# Patient Record
Sex: MALE | Race: WHITE | Employment: STUDENT | URBAN - METROPOLITAN AREA
[De-identification: names, ages, dates, MRNs, and addresses within clinical notes are randomized per-mention and may not be internally consistent; named-entity substitution may affect disease eponyms.]

---
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
## 2023-01-09 ENCOUNTER — APPOINTMENT (OUTPATIENT)
Dept: CT IMAGING | Age: 18
End: 2023-01-09
Payer: COMMERCIAL

## 2023-01-09 ENCOUNTER — HOSPITAL ENCOUNTER (EMERGENCY)
Age: 18
Discharge: HOME | End: 2023-01-10
Attending: EMERGENCY MEDICINE
Payer: COMMERCIAL

## 2023-01-09 DIAGNOSIS — R51.9 ACUTE NONINTRACTABLE HEADACHE, UNSPECIFIED HEADACHE TYPE: Primary | ICD-10-CM

## 2023-01-09 LAB
A/G RATIO: 1.8 (ref 1.1–2.2)
ALBUMIN SERPL-MCNC: 4.6 G/DL (ref 3.4–5)
ALP BLD-CCNC: 81 U/L (ref 40–129)
ALT SERPL-CCNC: 34 U/L (ref 10–40)
ANION GAP SERPL CALCULATED.3IONS-SCNC: 11 MMOL/L (ref 3–16)
AST SERPL-CCNC: 37 U/L (ref 15–37)
BASOPHILS ABSOLUTE: 0 K/UL (ref 0–0.2)
BASOPHILS RELATIVE PERCENT: 0.6 %
BILIRUB SERPL-MCNC: 1.4 MG/DL (ref 0–1)
BUN BLDV-MCNC: 15 MG/DL (ref 7–20)
CALCIUM SERPL-MCNC: 9.4 MG/DL (ref 8.3–10.6)
CHLORIDE BLD-SCNC: 100 MMOL/L (ref 99–110)
CO2: 27 MMOL/L (ref 21–32)
CREAT SERPL-MCNC: 0.8 MG/DL (ref 0.9–1.3)
EOSINOPHILS ABSOLUTE: 0.1 K/UL (ref 0–0.6)
EOSINOPHILS RELATIVE PERCENT: 1.5 %
GFR SERPL CREATININE-BSD FRML MDRD: >60 ML/MIN/{1.73_M2}
GLUCOSE BLD-MCNC: 86 MG/DL (ref 70–99)
HCT VFR BLD CALC: 45.2 % (ref 40.5–52.5)
HEMOGLOBIN: 15.5 G/DL (ref 13.5–17.5)
LYMPHOCYTES ABSOLUTE: 2.4 K/UL (ref 1–5.1)
LYMPHOCYTES RELATIVE PERCENT: 36.7 %
MCH RBC QN AUTO: 29.5 PG (ref 26–34)
MCHC RBC AUTO-ENTMCNC: 34.2 G/DL (ref 31–36)
MCV RBC AUTO: 86.2 FL (ref 80–100)
MONOCYTES ABSOLUTE: 0.4 K/UL (ref 0–1.3)
MONOCYTES RELATIVE PERCENT: 6.8 %
NEUTROPHILS ABSOLUTE: 3.6 K/UL (ref 1.7–7.7)
NEUTROPHILS RELATIVE PERCENT: 54.4 %
PDW BLD-RTO: 13 % (ref 12.4–15.4)
PLATELET # BLD: 214 K/UL (ref 135–450)
PMV BLD AUTO: 8.7 FL (ref 5–10.5)
POTASSIUM REFLEX MAGNESIUM: 4 MMOL/L (ref 3.5–5.1)
RBC # BLD: 5.24 M/UL (ref 4.2–5.9)
SODIUM BLD-SCNC: 138 MMOL/L (ref 136–145)
TOTAL PROTEIN: 7.2 G/DL (ref 6.4–8.2)
WBC # BLD: 6.6 K/UL (ref 4–11)

## 2023-01-09 PROCEDURE — 70450 CT HEAD/BRAIN W/O DYE: CPT

## 2023-01-09 PROCEDURE — 80053 COMPREHEN METABOLIC PANEL: CPT

## 2023-01-09 PROCEDURE — 99285 EMERGENCY DEPT VISIT HI MDM: CPT

## 2023-01-09 PROCEDURE — 85025 COMPLETE CBC W/AUTO DIFF WBC: CPT

## 2023-01-09 PROCEDURE — 6360000004 HC RX CONTRAST MEDICATION: Performed by: EMERGENCY MEDICINE

## 2023-01-09 PROCEDURE — 6370000000 HC RX 637 (ALT 250 FOR IP): Performed by: EMERGENCY MEDICINE

## 2023-01-09 PROCEDURE — 6360000002 HC RX W HCPCS: Performed by: EMERGENCY MEDICINE

## 2023-01-09 PROCEDURE — 96372 THER/PROPH/DIAG INJ SC/IM: CPT

## 2023-01-09 PROCEDURE — 70498 CT ANGIOGRAPHY NECK: CPT

## 2023-01-09 RX ORDER — KETOROLAC TROMETHAMINE 30 MG/ML
15 INJECTION, SOLUTION INTRAMUSCULAR; INTRAVENOUS ONCE
Status: DISCONTINUED | OUTPATIENT
Start: 2023-01-09 | End: 2023-01-09

## 2023-01-09 RX ORDER — ACETAMINOPHEN 500 MG
1000 TABLET ORAL ONCE
Status: COMPLETED | OUTPATIENT
Start: 2023-01-09 | End: 2023-01-09

## 2023-01-09 RX ORDER — ORPHENADRINE CITRATE 30 MG/ML
60 INJECTION INTRAMUSCULAR; INTRAVENOUS ONCE
Status: COMPLETED | OUTPATIENT
Start: 2023-01-09 | End: 2023-01-09

## 2023-01-09 RX ADMIN — IOPAMIDOL 75 ML: 755 INJECTION, SOLUTION INTRAVENOUS at 22:27

## 2023-01-09 RX ADMIN — ACETAMINOPHEN 1000 MG: 500 TABLET ORAL at 21:50

## 2023-01-09 RX ADMIN — ORPHENADRINE CITRATE 60 MG: 30 INJECTION INTRAMUSCULAR; INTRAVENOUS at 21:50

## 2023-01-09 ASSESSMENT — PAIN SCALES - GENERAL
PAINLEVEL_OUTOF10: 9
PAINLEVEL_OUTOF10: 8

## 2023-01-09 ASSESSMENT — PAIN - FUNCTIONAL ASSESSMENT: PAIN_FUNCTIONAL_ASSESSMENT: 0-10

## 2023-01-09 ASSESSMENT — PAIN DESCRIPTION - DESCRIPTORS: DESCRIPTORS: ACHING

## 2023-01-09 ASSESSMENT — PAIN DESCRIPTION - ORIENTATION: ORIENTATION: MID

## 2023-01-09 ASSESSMENT — PAIN DESCRIPTION - LOCATION: LOCATION: HEAD

## 2023-01-10 VITALS
RESPIRATION RATE: 16 BRPM | DIASTOLIC BLOOD PRESSURE: 67 MMHG | BODY MASS INDEX: 30.48 KG/M2 | OXYGEN SATURATION: 97 % | WEIGHT: 230 LBS | HEIGHT: 73 IN | TEMPERATURE: 98.4 F | SYSTOLIC BLOOD PRESSURE: 115 MMHG | HEART RATE: 60 BPM

## 2023-01-10 RX ORDER — ORPHENADRINE CITRATE 100 MG/1
100 TABLET, EXTENDED RELEASE ORAL 2 TIMES DAILY PRN
Qty: 20 TABLET | Refills: 0 | Status: SHIPPED | OUTPATIENT
Start: 2023-01-10 | End: 2023-01-20

## 2023-01-10 RX ORDER — ACETAMINOPHEN 500 MG
500 TABLET ORAL 4 TIMES DAILY PRN
Qty: 30 TABLET | Refills: 0 | Status: SHIPPED | OUTPATIENT
Start: 2023-01-10

## 2023-01-10 ASSESSMENT — PAIN SCALES - GENERAL: PAINLEVEL_OUTOF10: 2

## 2023-01-10 ASSESSMENT — PAIN - FUNCTIONAL ASSESSMENT: PAIN_FUNCTIONAL_ASSESSMENT: 0-10

## 2023-01-10 NOTE — ED PROVIDER NOTES
[facility]  ED  EMERGENCY DEPARTMENT ENCOUNTER        Pt Name: Ash See  MRN: [id number]  [place] 2005  Date of evaluation: 1/9/2023  Provider: Khushboo Delgado MD  PCP: LAINE Shetty CNP  Note Started: 8:55 PM EST 1/9/23    CHIEF COMPLAINT       Chief Complaint   Patient presents with    Headache     Starting Friday. Was working out and during started have a headache. 8/10 pain       HISTORY OF PRESENT ILLNESS: 1 or more Elements             Doug Roberson Crew is a 21 y.o. male who presents with a headache. Was lifting weights at the gym then he felt a pain radiate from his lower back all the way to the base of his head an since then the pain has been located to the left side of the neck and he feels like the rest of his head feels really tight. The pain has been constant since Fri. Tried ibuprofen and tylenol with no improvement. No fever. There is neck stiffness, he has increased pain with movement of the neck. No weakness, no vision changes, no speech changes, no gait changes. Nursing Notes were all reviewed and agreed with or any disagreements were addressed in the HPI. REVIEW OF SYSTEMS :      Review of Systems    Positives and Pertinent negatives as per HPI. SURGICAL HISTORY     Past Surgical History:   Procedure Laterality Date    ADENOIDECTOMY      TONSILLECTOMY      TYMPANOPLASTY         CURRENTMEDICATIONS       Previous Medications    No medications on file       ALLERGIES     Patient has no known allergies. FAMILYHISTORY     History reviewed. No pertinent family history. SOCIAL HISTORY       Social History     Tobacco Use    Smoking status: Never    Smokeless tobacco: Never   Vaping Use    Vaping Use: Never used   Substance Use Topics    Alcohol use: No    Drug use: No       SCREENINGS   NIH Stroke Scale  Interval: Baseline  Level of Consciousness (1a): Alert  LOC Questions (1b):  Answers both correctly  LOC Commands (1c): Performs both tasks correctly  Best Gaze (2): Normal  Visual (3): No visual loss  Facial Palsy (4): Normal symmetrical movement  Motor Arm, Left (5a): No drift  Motor Arm, Right (5b): No drift  Motor Leg, Left (6a): No drift  Motor Leg, Right (6b): No drift  Limb Ataxia (7): Absent  Sensory (8): Normal  Best Language (9): No aphasia  Dysarthria (10): Normal  Extinction and Inattention (11): No abnormality  Total: 0    Saji Coma Scale  Eye Opening: Spontaneous  Best Verbal Response: Oriented  Best Motor Response: Obeys commands  [place] Coma Scale Score: 15                CIWA Assessment  BP: 126/73  Heart Rate: 65           PHYSICAL EXAM  1 or more Elements     ED Triage Vitals [01/09/23 1854]   BP Temp Temp Source Heart Rate Resp SpO2 Height Weight - Scale   (!) 151/85 98.4 °F (36.9 °C) Oral 61 16 99 % 6' 1\" (1.854 m) (!) 230 lb (104.3 kg)       Physical Exam  Vitals and nursing note reviewed. Constitutional:       Appearance: Normal appearance. He is well-developed. He is not ill-appearing. HENT:      Head: Normocephalic and atraumatic. Right Ear: External ear normal.      Left Ear: External ear normal.      Nose: Nose normal.   Eyes:      General: No scleral icterus. Right eye: No discharge. Left eye: No discharge. Extraocular Movements: Extraocular movements intact. Conjunctiva/sclera: Conjunctivae normal.      Pupils: Pupils are equal, round, and reactive to light. Neck:      Comments: No rigidity or pain with flexion but does have pain to rotation  Cardiovascular:      Rate and Rhythm: Normal rate and regular rhythm. Heart sounds: Normal heart sounds. Pulmonary:      Effort: Pulmonary effort is normal. No respiratory distress. Breath sounds: Normal breath sounds. No wheezing or rales. Abdominal:      General: Bowel sounds are normal. There is no distension. Palpations: Abdomen is soft. Tenderness: There is no abdominal tenderness.  There is no guarding or rebound. Musculoskeletal:         General: Tenderness present. No swelling, deformity or signs of injury. Cervical back: Neck supple. Tenderness present. No rigidity. Comments: Tenderness to left upper back and neck   Skin:     Coloration: Skin is not pale. Neurological:      Mental Status: He is alert. Comments: NIHSS 0, no nystagmus, no ataxia, normal gait   Psychiatric:         Mood and Affect: Mood normal.         Behavior: Behavior normal.           DIAGNOSTIC RESULTS   LABS:    Labs Reviewed   COMPREHENSIVE METABOLIC PANEL W/ REFLEX TO MG FOR LOW K - Abnormal; Notable for the following components:       Result Value    Creatinine 0.8 (*)     Total Bilirubin 1.4 (*)     All other components within normal limits   CBC WITH AUTO DIFFERENTIAL       When ordered only abnormal lab results are displayed. All other labs were within normal range or not returned as of this dictation. EKG:     RADIOLOGY:   Non-plain film images such as CT, Ultrasound and MRI are read by the radiologist. Plain radiographic images are visualized and preliminarily interpreted by the ED Provider with the below findings:        Interpretation per the Radiologist below, if available at the time of this note:    CTA HEAD NECK W CONTRAST   Final Result   Unremarkable CTA of the head and neck. CT Head W/O Contrast   Final Result   No acute CT abnormality identified. No results found. No results found. PROCEDURES   Unless otherwise noted below, none     Procedures    CRITICAL CARE TIME   Total Critical Care time was 35 minutes, excluding separately reportable procedures. There was a high probability of clinically significant/life threatening deterioration in the patient's condition which required my urgent intervention. PAST MEDICAL HISTORY      has a past medical history of Anxiety, Depression, and Seasonal allergies.      EMERGENCY DEPARTMENT COURSE and DIFFERENTIAL DIAGNOSIS/MDM:   Vitals: Vitals:    01/09/23 1854 01/09/23 2149   BP: (!) 151/85 126/73   Pulse: 61 65   Resp: 16 16   Temp: 98.4 °F (36.9 °C)    TempSrc: Oral    SpO2: 99% 97%   Weight: (!) 230 lb (104.3 kg)    Height: 6' 1\" (1.854 m)        Patient was given the following medications:  Medications   orphenadrine (NORFLEX) injection 60 mg (60 mg IntraMUSCular Given 1/9/23 2150)   acetaminophen (TYLENOL) tablet 1,000 mg (1,000 mg Oral Given 1/9/23 2150)   iopamidol (ISOVUE-370) 76 % injection 75 mL (75 mLs IntraVENous Given 1/9/23 2227)             Is this patient to be included in the SEP-1 Core Measure due to severe sepsis or septic shock? No   Exclusion criteria - the patient is NOT to be included for SEP-1 Core Measure due to: Infection is not suspected    Chronic Conditions:     CONSULTS: (Who and What was discussed)  None                CC/HPI Summary, DDx, ED Course, and Reassessment: Adult male who comes in with a headache. The patient's headache started while he was exercising. He has associated neck pain and neck stiffness. This is concerning for subarachnoid hemorrhage, based on his history and physical exam I have little suspicion for acute meningitis or acute CVA. Little suspicion for carotid artery dissection. The most likely diagnosis however is musculoskeletal pain as he does have significant tenderness to the muscles of the neck and upper back on the left. Patient is given a dose of Norflex IV and Tylenol orally. His pain is initially rated at an 8 or 9. Basic laboratory studies are ordered. Laboratory studies showed no acute findings when his hematocrit is normal lessening the chance of this being a subarachnoid hemorrhage. His symptoms have been for several days a CT scan of his head and CTA of his head and neck ordered. Both are read and interpreted by the radiologist and are negative for any acute process decreasing the chance of this being an acute CVA or a mass.   I discussed with the patient and his mother the possibility of this still being a subarachnoid hemorrhage and that the only definitive way to rule this out would be a lumbar puncture. The patient states that his pain has significantly improved after the medications he refuses a lumbar puncture at this time. Physical exam is unremarkable for a completely normal neurologic exam.  I think that this is reasonable at this point. The patient is strongly encouraged to return to the emergency room should his symptoms worsen. I am the Primary Clinician of Record. FINAL IMPRESSION      1.  Acute nonintractable headache, unspecified headache type          DISPOSITION/PLAN     DISPOSITION Decision To Discharge 01/10/2023 12:09:26 AM      PATIENT REFERRED TO:  LAINE Davila CNP [address] [facility]  [phone]    Schedule an appointment as soon as possible for a visit       [facility]  ED  [facility] [address]  [phone]    If symptoms worsen    DISCHARGE MEDICATIONS:  New Prescriptions    ACETAMINOPHEN (TYLENOL) 500 MG TABLET    Take 1 tablet by mouth 4 times daily as needed for Pain    ORPHENADRINE (NORFLEX) 100 MG EXTENDED RELEASE TABLET    Take 1 tablet by mouth 2 times daily as needed for Muscle spasms or Other (headache)       DISCONTINUED MEDICATIONS:  Discontinued Medications    No medications on file              (Please note that portions of this note were completed with a voice recognition program.  Efforts were made to edit the dictations but occasionally words are mis-transcribed.)    Jeanine Marshall MD (electronically signed)            Jeanine Marshall MD  01/10/23 5995

## 2023-02-01 ENCOUNTER — HOSPITAL ENCOUNTER (OUTPATIENT)
Dept: PHYSICAL THERAPY | Age: 18
Setting detail: THERAPIES SERIES
Discharge: HOME | End: 2023-02-01
Payer: COMMERCIAL

## 2023-02-01 PROCEDURE — 97140 MANUAL THERAPY 1/> REGIONS: CPT

## 2023-02-01 PROCEDURE — 97162 PT EVAL MOD COMPLEX 30 MIN: CPT

## 2023-02-01 NOTE — FLOWSHEET NOTE
[facility], [facility], [address], [address]  Phone: [phone]  Fax [phone]    Physical Therapy Daily Treatment Note    Date:  2023    Patient Name:  Ash See    :  2005  MRN: [id number]  Medical Diagnosis: Strain of muscle, fascia and tendon at neck level, subsequent encounter [S16. 1XXD]  Treatment Diagnosis:  decreased mobility, pain  Onset Date:  23                Referral Date: 2023           Referring Provider: Checo Solis   Insurance Provider: Precious Phillip  Restrictions/Precautions:   none  Plan of care signed (Y/N):  sent  Visit# / total visits:       G-Code (if applicable):          NDI     Time in:   11:25      Timed Treatment: 10  Total Treatment Time:  40  ________________________________________________________________________________________    Pain Level:    /10  SUBJECTIVE:  see eval    OBJECTIVE:     Exercise/Equipment Resistance/Repetitions Other comments                                                                                                                                             Other Therapeutic Activities:      Manual Treatments:     cervical distraction. Supine thoracic PA mobilization grade IV. STM to B upper quarter.     Modalities:      Test/Measurements:         ASSESSMENT:         Treatment/Activity Tolerance:   []Patient tolerated treatment well [] Patient limited by fatique  []Patient limited by pain [] Patient limited by other medical complications  [] Other:     Goals:    Short term goal 1: Pt will be indep in HEP  Short term goal 2: Pt will decrease pain 25%  Short term goal 3: Pt will increase cervical ROM by >10%  Short term goal 4: Pt will increase SNF strength to 4/5  Short term goal 5: Pt will return to driving without limitation    Plan: [x] Continue per plan of care [] Alter current plan (see comments)   [x] Plan of care initiated [] Hold pending MD visit [] Discharge      Plan for Next Session:  Biomechanical correction of problems as they present. Facilitate correct muscle firing patterns and activation with appropriate activities. Progress patient as tolerated.      Re-Certification Due Date:         Signature:  José Antonio Jones, PT

## 2023-02-01 NOTE — THERAPY EVALUATION
Rick  [facility], [facility], [address], [address]  Phone: [phone]  Fax [phone]     Dear Referring Practitioner: Dr. Kirsten Starr,     We had the pleasure of evaluating the following patient for physical therapy services at [facility]. A summary of our findings can be found in the initial assessment below. This includes our plan of care. If you have any questions or concerns regarding these findings, please do not hesitate to contact me at the office phone number. Thank you for the referral.         Physician Signature:_______________________________Date:__________________  By signing above (or electronic signature), therapists plan is approved by physician      CERVICAL, THORACIC SPINE, AND UPPER EXTREMITY PHYSICAL THERAPY EVALUATION        Evaluation Date: 2/1/2023   Patient Name: Katherine Albert   YOB: 2005    Medical Diagnosis: Strain of muscle, fascia and tendon at neck level, subsequent encounter [S16. 1XXD]  Treatment Diagnosis:  decreased mobility, pain  Onset Date:  1/13/23    Referral Date: 2/1/2023   Referring Provider: Angelito Jansen   Insurance Provider: Uli Ball  Restrictions/Precautions:   none    SUBJECTIVE FINDINGS    History of Present Illness:      Pt presents to physical therapy with c/o neck pain. Notes that he injured himself back on Jan 13 when he was dead lifting. Reports that he felt a shooting pain that went up his back to the base of his skull. Notes that this pain now moves from the L side of his neck over to his R upper trap. Does note a little tingling at the base of the skull on the L side. Notes that he does get HAs and dizziness. Denies double vision, trouble speaking/swallowing. Deep breath creates a \"Squeezing\" of his head. Reports that movement or \"increasing blood flow\" makes his head \"kill. \" Taking medication helps for a few hours.  Symptoms have remained the same. Reports that he hasn't tried turning and moving from his neck. Stiff and fearful of moving. Reports that he does not feel weak. Notes that he drives a lot for work. Continuing to work but looking in the blind spot is hard and painful. Sleeping is okay if his neck is completely flat. 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 Impression   No acute CT abnormality identified.        Posture    [x]   Forward head  []   Forward flexed trunk   [x]   Pronounced CT junction    [x]   Increased thoracic kyphosis   []   Increased lumbar lordosis   []   Scoliosis   []   Swayback  []   Scapular winging:  []   Other:       Palpation/Tenderness/Visual Inspection      Patient reported tenderness with palpation  [x]   Yes   []   No   []   NT  Location:  global tenderness noted, increased tenderness noted at L first rib, mid cervical spine and suboccipitals  PT notes increased muscle tone   []   Yes   [x]   No   []   NT  Location:   Overhead mobility: limited elevated, slow movement, increased scapular abduction and downward rotation    Special Tests- Cervical and Thoracic   Special Test Findings Comments   Pierce Wilburn [x]Neg   []Pos   []NT    Alar ligament/ C2 spinous kick test [x]Neg   []Pos   []NT    Vertebral Artery/Positional Provocative Testing [x]Neg   []Pos R   []Pos L   []NT    Dizziness, Nausea, Double Vision []Neg   [x]Pos       Spurling's/Foraminal []Neg   []Pos R   []Pos L   [x]NT    Cranial Nerves [x]Neg   []Pos R   []Pos L Cervical Distraction [x]Relief noted   []No relief noted   []NT    Cervical Compression  []Neg   []Pos   [x]NT    Median nerve tension test []Neg   []Pos R   []Pos L   [x]NT    Radial nerve tension test []Neg   []Pos R   []Pos L   [x]NT    Ulnar nerve tension test []Neg   []Pos R   []Pos L   [x]NT        Range of Motion/Strength Testing/Myotomes    [x] All ROM and strength WNL except as marked below  *denotes pain  Range Tested AROM Strength (MMT)/Myotomes    Left (or  neutral)  Right Left (or neutral) Right   Cervical Flex (C1-2) 25%      Cervical Ext 75%      Cervical SB (C3) 75% 75%     Cervical Rot 50% 50%     Shoulder Flex   4 4   Shoulder Abd (C5)   5 5   Shoulder IR   5 5   Shoulder ER    5 5   Elbow flex (C6)   5 5   Elbow ext (C7)   5 5   Wrist ext (C6)       Wrist flex (C7)       Thumb Ext (C8)         UE Dermatomes     [x] All dermatomes WNL except as marked below   Dermatomes  Left  Right Comments   Posterior Head (C2)      Lateral Upper Neck (C3)      Supraclavicular (C4)      Lateral Upper Arm (C5)      Lateral Forearm/1st (C6)      3rd digit (C7)      5th digit (C8)      Medial Arm (T1)        Reflexes      [x] All reflexes WNL except as marked below  Reflex Left Right   Biceps (C5, C6)     Brachioradialis (C6)     Triceps (C7)     Hoffmans - -   Clonus - -     Scapula Strength     [x] All strength WNL except as marked below   Scapula Left Right Comments   Middle Trapezius 4 4    Lower Trapezius 3- 3-    Rhomboid 4 4    Serratus Anterior   3+ 4    Latissimus Dorsi        Flexibility   [x] All flexibility WNL except as marked below     Muscle Findings   Pectoralis Minor [] WNL   [x] Tight    Pec Major - Lower [] WNL   [x] Tight   Pec Major - Upper [] WNL   [x] Tight   Latissimus Dorsi  [] WNL   [x] Tight   Levator Scapula [] WNL   [] Tight   Suboccipitals [] WNL   [x] Tight   Upper Trapezius [] WNL   [x] Tight   Scalenes [] WNL   [x] Tight     Joint Mobility/Accessory Movements (cervical, UE, rib) 1st rib - hypomobility noted L  CTJ - hypomobile with PA and rotation joint play B  C1/2 - hypomobile with R rotation    ASSESSMENT  Pt is a 22 y/o presenting to physical therapy with c/o cervical pain and decreased mobility. Through evaluation and examination, identified findings consistent with mid and upper cervical joint dysfunction. Pt also demonstrated 1st rib dysfunction on the L. Pt with increased tenderness to touch with anxiety observed upon movement - very slow labored motions. PT recommending skilled physical therapy in order to address goals and return to function. Body Structures, Functions, Activity Limitations Requiring Skilled Therapeutic Intervention: Decreased functional mobility ,Decreased ADL status,Decreased ROM,Decreased body mechanics,Decreased strength,Increased pain     Statement of Medical Necessity: Physical Therapy is both indicated and medically necessary as outlined in the POC to increase the likelihood of meeting the functionally related goals stated below.       Eval Complexity:    Decision Making: MOD Complexity     PLAN OF CARE    Frequency: 2x/wk for 4 weeks  Current Treatment Recommendations: Therapeutic exercise, therapeutic activity, manual therapy, neuromuscular re-education     FUNCTIONAL OUTCOME MEASURE  NDI 21/50     GOALS  Short term goal 1: Pt will be indep in HEP  Short term goal 2: Pt will decrease pain 25%  Short term goal 3: Pt will increase cervical ROM by >10%  Short term goal 4: Pt will increase SNF strength to 4/5  Short term goal 5: Pt will return to driving without limitation      Time In: 11:35  Time Out: 12:15  Timed Code Treatment Minutes: 10 minutes            Total Treatment Time:  40 minutes    Misha Long PT DPT license # [id number]

## 2023-02-08 ENCOUNTER — HOSPITAL ENCOUNTER (OUTPATIENT)
Dept: PHYSICAL THERAPY | Age: 18
Setting detail: THERAPIES SERIES
Discharge: HOME | End: 2023-02-08
Payer: COMMERCIAL

## 2023-02-08 PROCEDURE — 97140 MANUAL THERAPY 1/> REGIONS: CPT

## 2023-02-08 PROCEDURE — 97110 THERAPEUTIC EXERCISES: CPT

## 2023-02-08 NOTE — FLOWSHEET NOTE
[facility], [facility], [address], [address]  Phone: [phone]  Fax [phone]    Physical Therapy Daily Treatment Note    Date:  2023    Patient Name:  Malgorzata Chandler    :  2005  MRN: [id number]  Medical Diagnosis: Strain of muscle, fascia and tendon at neck level, subsequent encounter [S16. 1XXD]  Treatment Diagnosis:  decreased mobility, pain  Onset Date:  23                Referral Date: 2023           Referring Provider: Brett Lawrence   Insurance Provider: [facility]  Restrictions/Precautions:   none  Plan of care signed (Y/N):  sent  Visit# / total visits:       G-Code (if applicable):          NDI     Time in:   7:12     Timed Treatment: 33  Total Treatment Time:  40  ________________________________________________________________________________________    Pain Level:    /10  SUBJECTIVE:  Pt notes that he has been trying more movement but he popped his neck yesterday nd this gave him a HA. Notes that prior to that everything was going okay. \"Still hurts, still a little stiff. \"    OBJECTIVE: [id number]    Exercise/Equipment Resistance/Repetitions Other comments          Supine chin tuck 10x10\"    Supine cervical rotation X10 B    Supine pivot prone X10                                                                                                                   Other Therapeutic Activities:      Manual Treatments:     cervical distraction. Supine thoracic PA mobilization grade IV. STM to B upper quarter. Suboccipital release. Gentle cervical side glides throughout cervical spine B. Poor tolerance to higher grade mobilization and declined grade V this date. Modalities:      Test/Measurements:         ASSESSMENT:    Pt tolerated session well. Increased cervical ROM. Pt continues with guarded movement, limiting ROM. Initiated HEP without issue.      Treatment/Activity Tolerance: []Patient tolerated treatment well [] Patient limited by fatique  [x]Patient limited by pain [] Patient limited by other medical complications  [] Other:     Goals:    Short term goal 1: Pt will be indep in HEP  Short term goal 2: Pt will decrease pain 25%  Short term goal 3: Pt will increase cervical ROM by >10%  Short term goal 4: Pt will increase SNF strength to 4/5  Short term goal 5: Pt will return to driving without limitation    Plan: [x] Continue per plan of care [] Alter current plan (see comments)   [] Plan of care initiated [] Hold pending MD visit [] Discharge      Plan for Next Session:  Biomechanical correction of problems as they present. Facilitate correct muscle firing patterns and activation with appropriate activities. Progress patient as tolerated.      Re-Certification Due Date:         Signature:  Hubert Serrano PT

## 2023-02-14 ENCOUNTER — HOSPITAL ENCOUNTER (OUTPATIENT)
Dept: PHYSICAL THERAPY | Age: 18
Setting detail: THERAPIES SERIES
Discharge: HOME | End: 2023-02-14
Payer: COMMERCIAL

## 2023-02-14 PROCEDURE — 97110 THERAPEUTIC EXERCISES: CPT

## 2023-02-14 PROCEDURE — 97140 MANUAL THERAPY 1/> REGIONS: CPT

## 2023-02-14 NOTE — FLOWSHEET NOTE
Rick  [facility]. and Therapy, [facility],  [address]  [facility], [address]  Phone: [phone]  Fax [phone]    Physical Therapy Daily Treatment Note    Date:  2023    Patient Name:  Dash Montez    :  2005  MRN: [id number]  Medical Diagnosis: Strain of muscle, fascia and tendon at neck level, subsequent encounter [S16. 1XXD]  Treatment Diagnosis:  decreased mobility, pain  Onset Date:  23                Referral Date: 2023           Referring Provider: dINK   Insurance Provider: Justin Lazo  Restrictions/Precautions:   none  Plan of care signed (Y/N):  sent  Visit# / total visits:       G-Code (if applicable):          NDI     Time in:   9:07     Timed Treatment: 38  Total Treatment Time:  38  ________________________________________________________________________________________    Pain Level:    /10  SUBJECTIVE:  Pt notes that he is okay. \"I have a really bad headache this morning. \" Notes that work is going well. OBJECTIVE: [id number]    Exercise/Equipment Resistance/Repetitions Other comments          Supine chin tuck 10x10\"    Supine cervical rotation X10 B    Supine pivot prone X10     Supine S HABD x15 Grey TB   Prone W    T 5x10\"  5x10\"                                                                                                    Other Therapeutic Activities:      Manual Treatments:     cervical distraction. Supine thoracic PA mobilization grade IV. STM to B upper quarter. Suboccipital release. Gentle cervical side glides throughout cervical spine B. Poor tolerance to higher grade mobilization and declined grade V this date. Modalities:      Test/Measurements:         ASSESSMENT:    Pt tolerated session well. Increased cervical ROM. Pt continues with guarded movement, limiting ROM. Progressed exercise without issue.      Treatment/Activity Tolerance:   []Patient tolerated treatment well [] Patient limited by fatique  [x]Patient limited by pain [] Patient limited by other medical complications  [] Other:     Goals:    Short term goal 1: Pt will be indep in HEP  Short term goal 2: Pt will decrease pain 25%  Short term goal 3: Pt will increase cervical ROM by >10%  Short term goal 4: Pt will increase SNF strength to 4/5  Short term goal 5: Pt will return to driving without limitation    Plan: [x] Continue per plan of care [] Alter current plan (see comments)   [] Plan of care initiated [] Hold pending MD visit [] Discharge      Plan for Next Session:  Biomechanical correction of problems as they present. Facilitate correct muscle firing patterns and activation with appropriate activities. Progress patient as tolerated.      Re-Certification Due Date:         Signature:  Gil Dhaliwal PT

## 2023-02-16 ENCOUNTER — HOSPITAL ENCOUNTER (OUTPATIENT)
Dept: PHYSICAL THERAPY | Age: 18
Setting detail: THERAPIES SERIES
Discharge: HOME | End: 2023-02-16
Payer: COMMERCIAL

## 2023-02-16 PROCEDURE — 97110 THERAPEUTIC EXERCISES: CPT

## 2023-02-16 PROCEDURE — 97140 MANUAL THERAPY 1/> REGIONS: CPT

## 2023-02-16 NOTE — FLOWSHEET NOTE
[facility], [facility], [address], [address]  Phone: [phone]  Fax [phone]    Physical Therapy Daily Treatment Note    Date:  2023    Patient Name:  Cornelio Pierce    :  2005  MRN: [id number]  Medical Diagnosis: Strain of muscle, fascia and tendon at neck level, subsequent encounter [S16. 1XXD]  Treatment Diagnosis:  decreased mobility, pain  Onset Date:  23                Referral Date: 2023           Referring Provider: Leta Berger   Insurance Provider: [facility]  Restrictions/Precautions:   none  Plan of care signed (Y/N):  sent  Visit# / total visits:  3/8     G-Code (if applicable):          NDI     Time in:   12:45     Timed Treatment: 38  Total Treatment Time:  38  ________________________________________________________________________________________    Pain Level:    /10  SUBJECTIVE:  Pt notes that he is okay. \"I have a really bad headache this morning. \" Notes that work is going well. OBJECTIVE: [id number]    Exercise/Equipment Resistance/Repetitions Other comments          Supine chin tuck 10x10\"  5x10\"   Manual resistance   Supine cervical rotation X10 B    Supine pivot prone X10     Supine S HABD x15 Grey TB   Prone W    T 5x10\"  5x10\"    Upper trap wall slide x5    Foam roll stretch 3 min                                                                                      Other Therapeutic Activities:      Manual Treatments:     cervical distraction. Supine thoracic PA mobilization grade IV. STM to B upper quarter. Suboccipital release. Gentle cervical side glides throughout cervical spine B. Poor tolerance to higher grade mobilization and declined grade V this date. Modalities:      Test/Measurements:         ASSESSMENT:    Pt tolerated session well. Increased cervical ROM. Pt continues with guarded movement, limiting ROM. Progressed exercise without issue. Treatment/Activity Tolerance:   []Patient tolerated treatment well [] Patient limited by fatique  [x]Patient limited by pain [] Patient limited by other medical complications  [] Other:     Goals:    Short term goal 1: Pt will be indep in HEP  Short term goal 2: Pt will decrease pain 25%  Short term goal 3: Pt will increase cervical ROM by >10%  Short term goal 4: Pt will increase SNF strength to 4/5  Short term goal 5: Pt will return to driving without limitation    Plan: [x] Continue per plan of care [] Alter current plan (see comments)   [] Plan of care initiated [] Hold pending MD visit [] Discharge      Plan for Next Session:  Biomechanical correction of problems as they present. Facilitate correct muscle firing patterns and activation with appropriate activities. Progress patient as tolerated.      Re-Certification Due Date:         Signature:  Melinda Agrawal PT

## 2023-02-21 ENCOUNTER — HOSPITAL ENCOUNTER (OUTPATIENT)
Dept: PHYSICAL THERAPY | Age: 18
Setting detail: THERAPIES SERIES
Discharge: HOME | End: 2023-02-21
Payer: COMMERCIAL

## 2023-02-21 PROCEDURE — 97140 MANUAL THERAPY 1/> REGIONS: CPT

## 2023-02-21 NOTE — FLOWSHEET NOTE
Rick  77. and Therapy, [facility],  [address], [address]  Phone: [phone]  Fax [phone]    Physical Therapy Daily Treatment Note    Date:  2023    Patient Name:  Kiesha Ndiaye    :  2005  MRN: [id number]  Medical Diagnosis: Strain of muscle, fascia and tendon at neck level, subsequent encounter [S16. 1XXD]  Treatment Diagnosis:  decreased mobility, pain  Onset Date:  23                Referral Date: 2023           Referring Provider: Giorgi Alegria   Insurance Provider: Delbert Gomez  Restrictions/Precautions:   none  Plan of care signed (Y/N):  sent  Visit# / total visits:       G-Code (if applicable):          NDI     Time in:   10:00     Timed Treatment: 15  Total Treatment Time: 15  ________________________________________________________________________________________    Pain Level:    /10  SUBJECTIVE:  Pt arrived 30 minutes late to today's session. Notes that the exercises haven't been hurting. Also notes that he is doing okay but still a little tense when he looks up and right. Reports that he tried to carry a jump box and that gave him a HA. OBJECTIVE: [id number]    Exercise/Equipment Resistance/Repetitions Other comments          Supine chin tuck   Manual resistance   Supine cervical rotation    Supine pivot prone    Supine S HABD Chavis TB   Prone W    T    Upper trap wall slide    Foam roll stretch 3 min                                                                                      Other Therapeutic Activities:      Manual Treatments:     cervical distraction. Supine thoracic PA mobilization grade IV. STM to B upper quarter. Suboccipital release. Gentle cervical side glides throughout cervical spine B, with cavitation at C23/3 on L during set-up. Poor tolerance to higher grade mobilization and declined grade V this date.      Modalities:      Test/Measurements:         ASSESSMENT:    Pt tolerated session well. Increased cervical ROM following manual therapy. Session limited by late arrival.     Treatment/Activity Tolerance:   []Patient tolerated treatment well [] Patient limited by fatique  [x]Patient limited by pain [] Patient limited by other medical complications  [] Other:     Goals:    Short term goal 1: Pt will be indep in HEP  Short term goal 2: Pt will decrease pain 25%  Short term goal 3: Pt will increase cervical ROM by >10%  Short term goal 4: Pt will increase SNF strength to 4/5  Short term goal 5: Pt will return to driving without limitation    Plan: [x] Continue per plan of care [] Alter current plan (see comments)   [] Plan of care initiated [] Hold pending MD visit [] Discharge      Plan for Next Session:  Biomechanical correction of problems as they present. Facilitate correct muscle firing patterns and activation with appropriate activities. Progress patient as tolerated.      Re-Certification Due Date:         Signature:  Kaity Schafer, PT

## 2023-02-24 ENCOUNTER — HOSPITAL ENCOUNTER (OUTPATIENT)
Dept: PHYSICAL THERAPY | Age: 18
Setting detail: THERAPIES SERIES
Discharge: HOME | End: 2023-02-24
Payer: COMMERCIAL

## 2023-02-24 PROCEDURE — 97140 MANUAL THERAPY 1/> REGIONS: CPT

## 2023-02-24 PROCEDURE — 97110 THERAPEUTIC EXERCISES: CPT

## 2023-02-24 NOTE — FLOWSHEET NOTE
Rick  77. and Therapy, [facility],  [address]  [place], [address]  Phone: [phone]  Fax [phone]    Physical Therapy Daily Treatment Note    Date:  2023    Patient Name:  Jm Moss    :  2005  MRN: [id number]  Medical Diagnosis: Strain of muscle, fascia and tendon at neck level, subsequent encounter [S16. 1XXD]  Treatment Diagnosis:  decreased mobility, pain  Onset Date:  23                Referral Date: 2023           Referring Provider: Angeline Heart   Insurance Provider: Fabiola Santamaria  Restrictions/Precautions:   none  Plan of care signed (Y/N):  sent  Visit# / total visits:       G-Code (if applicable):          NDI     Time in:   1:50    Timed Treatment: 40  Total Treatment Time: 40  ________________________________________________________________________________________    Pain Level:    /10  SUBJECTIVE:  Pt notes that he went back to the gym yesterday and did a very light workout. Notes that it was good and bad. Notes that he did not re-injure himself. Reports everything was good except he had a little stress with the preacher curl. OBJECTIVE: [id number]    Exercise/Equipment Resistance/Repetitions Other comments          Supine chin tuck    Lift from hands 10x10\"  x10 Manual resistance   Supine cervical rotation    Supine pivot prone    On wall X10   x10    Standing S HABD x15 Chavis TB   Prone W    T 5x10\"  5x10\"    Upper trap wall slide x10    Foam roll protocol 6 min                                                                                    Other Therapeutic Activities:      Manual Treatments:     cervical distraction. Supine thoracic PA mobilization grade IV. STM to B upper quarter. Suboccipital release. Gentle cervical side glides throughout cervical spine B, with cavitation at C23/3 on L during set-up. Modalities:      Test/Measurements:         ASSESSMENT:    Pt tolerated session well. Increased cervical ROM following manual therapy. Progress exercise without issue.     Treatment/Activity Tolerance:   []Patient tolerated treatment well [] Patient limited by fatique  [x]Patient limited by pain [] Patient limited by other medical complications  [] Other:     Goals:    Short term goal 1: Pt will be indep in HEP  Short term goal 2: Pt will decrease pain 25%  Short term goal 3: Pt will increase cervical ROM by >10%  Short term goal 4: Pt will increase SNF strength to 4/5  Short term goal 5: Pt will return to driving without limitation    Plan: [x] Continue per plan of care [] Alter current plan (see comments)   [] Plan of care initiated [] Hold pending MD visit [] Discharge      Plan for Next Session:  Biomechanical correction of problems as they present. Facilitate correct muscle firing patterns and activation with appropriate activities. Progress patient as tolerated.     Re-Certification Due Date:         Signature:  Aneesh Alvarez, PT

## 2023-02-28 ENCOUNTER — APPOINTMENT (OUTPATIENT)
Dept: PHYSICAL THERAPY | Age: 18
End: 2023-02-28
Payer: COMMERCIAL

## 2023-03-07 ENCOUNTER — HOSPITAL ENCOUNTER (OUTPATIENT)
Dept: PHYSICAL THERAPY | Age: 18
Setting detail: THERAPIES SERIES
Discharge: HOME | End: 2023-03-07

## 2023-03-07 NOTE — PROGRESS NOTES
Physical Therapy  Cancellation/No-show Note  Patient Name:  Jojo Starr  :  2005   Date:  3/7/2023  Cancels to date: 0  No-shows to date: 1    For today's appointment patient:  [] Cancelled  [] Rescheduled appointment  [x] No-show     Reason given by patient:  [] Patient ill  [] Conflicting appointment  [] No transportation    [] Conflict with work  [] No reason given  [] Other:     Comments:      Electronically signed by:  Estiven Dillard PT

## 2023-03-10 ENCOUNTER — HOSPITAL ENCOUNTER (OUTPATIENT)
Dept: PHYSICAL THERAPY | Age: 18
Setting detail: THERAPIES SERIES
Discharge: HOME | End: 2023-03-10

## 2023-03-10 NOTE — PROGRESS NOTES
Physical Therapy  Cancellation/No-show Note  Patient Name:  Devan House  :  2005   Date:  3/10/2023  Cancels to date: 0  No-shows to date: 2    For today's appointment patient:  [] Cancelled  [] Rescheduled appointment  [x] No-show     Reason given by patient:  [] Patient ill  [] Conflicting appointment  [] No transportation    [] Conflict with work  [] No reason given  [] Other:     Comments:      Electronically signed by:  Jill Haley PT

## 2023-03-14 ENCOUNTER — HOSPITAL ENCOUNTER (OUTPATIENT)
Dept: PHYSICAL THERAPY | Age: 18
Setting detail: THERAPIES SERIES
Discharge: HOME | End: 2023-03-14

## 2023-03-14 NOTE — PROGRESS NOTES
Physical Therapy  Cancellation/No-show Note  Patient Name:  Noemi Strange  :  2005   Date:  3/14/2023  Cancels to date: 0  No-shows to date: 3    For today's appointment patient:  [] Cancelled  [] Rescheduled appointment  [x] No-show     Reason given by patient:  [] Patient ill  [] Conflicting appointment  [] No transportation    [] Conflict with work  [] No reason given  [] Other:     Comments:      Electronically signed by:  Francisco J Griggs PT

## 2023-10-26 ENCOUNTER — HOSPITAL ENCOUNTER (EMERGENCY)
Age: 18
Discharge: HOME | End: 2023-10-26
Payer: COMMERCIAL

## 2023-10-26 ENCOUNTER — APPOINTMENT (OUTPATIENT)
Dept: GENERAL RADIOLOGY | Age: 18
End: 2023-10-26
Payer: COMMERCIAL

## 2023-10-26 VITALS
RESPIRATION RATE: 15 BRPM | DIASTOLIC BLOOD PRESSURE: 73 MMHG | HEART RATE: 84 BPM | HEIGHT: 73 IN | OXYGEN SATURATION: 99 % | BODY MASS INDEX: 32.43 KG/M2 | WEIGHT: 244.7 LBS | TEMPERATURE: 98.1 F | SYSTOLIC BLOOD PRESSURE: 131 MMHG

## 2023-10-26 DIAGNOSIS — M25.561 ACUTE PAIN OF RIGHT KNEE: Primary | ICD-10-CM

## 2023-10-26 PROCEDURE — 99283 EMERGENCY DEPT VISIT LOW MDM: CPT

## 2023-10-26 PROCEDURE — 73560 X-RAY EXAM OF KNEE 1 OR 2: CPT

## 2023-10-26 RX ORDER — ARIPIPRAZOLE 10 MG/1
10 TABLET ORAL DAILY
COMMUNITY

## 2023-10-26 RX ORDER — DEXTROAMPHETAMINE SACCHARATE, AMPHETAMINE ASPARTATE, DEXTROAMPHETAMINE SULFATE AND AMPHETAMINE SULFATE 5; 5; 5; 5 MG/1; MG/1; MG/1; MG/1
20 TABLET ORAL DAILY
COMMUNITY

## 2023-10-26 ASSESSMENT — PAIN - FUNCTIONAL ASSESSMENT: PAIN_FUNCTIONAL_ASSESSMENT: NONE - DENIES PAIN

## 2023-10-26 NOTE — ED PROVIDER NOTES
[address] [place] [address]  ED  eMERGENCY dEPARTMENT eNCOUnter        Pt Name: Jamari Alfred  MRN: [id number]  [facility] 2005  Date of evaluation: 10/26/2023  Provider: Ita Morris PA-C  PCP: LAINE Willams - CNP  ED Attending: Rupali Hale MD    LAKSHMI patient. This patient was not seen by the ED attending, though they were available to consult. History is provided by the patient. No limitations. CHIEF COMPLAINT:  Knee Pain (Right knee pain. Previously inured when playing football. States he was squatting about 450 lbs at the gym about 1 month ago, and thinks he re-injured the knee. )      HISTORY OF PRESENT ILLNESS:  Jamari Alfred is a 19 y.o. male who presents to the ED via private vehicle with complaints of right knee pain. Patient reports right knee pain for the last month. He started lifting weights and was squatting over 400 pounds. He believes that this is when his knee became acutely injured/aggravated. He does not have pain to palpate the knee or even with range of motion of the knee while at rest.  However, when he slowly moves into a squatting position he develops pain to the anterior aspect of the joint, just above the patella. He has been trying to take NSAIDs and manage the pain symptomatically. He called primary care and ultimately was told to come to the ED. No other complaints, modifying factors or associated symptoms. Nursing notes reviewed. Past Medical History:   Diagnosis Date    Anxiety     Depression     Seasonal allergies      Past Surgical History:   Procedure Laterality Date    ADENOIDECTOMY      TONSILLECTOMY      TYMPANOPLASTY       History reviewed. No pertinent family history.   Social History     Socioeconomic History    Marital status: Single     Spouse name: Not on file    Number of children: Not on file    Years of education: Not on file    Highest education level: Not on file   Occupational History    Not on file   Tobacco Use    Smoking

## 2023-10-30 ENCOUNTER — HOSPITAL ENCOUNTER (EMERGENCY)
Age: 18
Discharge: HOME | End: 2023-10-30
Attending: EMERGENCY MEDICINE
Payer: COMMERCIAL

## 2023-10-30 ENCOUNTER — APPOINTMENT (OUTPATIENT)
Dept: GENERAL RADIOLOGY | Age: 18
End: 2023-10-30
Payer: COMMERCIAL

## 2023-10-30 VITALS
SYSTOLIC BLOOD PRESSURE: 108 MMHG | HEART RATE: 86 BPM | OXYGEN SATURATION: 95 % | RESPIRATION RATE: 16 BRPM | WEIGHT: 240 LBS | TEMPERATURE: 99.3 F | DIASTOLIC BLOOD PRESSURE: 64 MMHG | BODY MASS INDEX: 31.81 KG/M2 | HEIGHT: 73 IN

## 2023-10-30 DIAGNOSIS — R51.9 ACUTE NONINTRACTABLE HEADACHE, UNSPECIFIED HEADACHE TYPE: ICD-10-CM

## 2023-10-30 DIAGNOSIS — R06.02 SHORTNESS OF BREATH: ICD-10-CM

## 2023-10-30 DIAGNOSIS — J01.20 ACUTE NON-RECURRENT ETHMOIDAL SINUSITIS: Primary | ICD-10-CM

## 2023-10-30 LAB
ALBUMIN SERPL-MCNC: 4.2 G/DL (ref 3.4–5)
ALBUMIN/GLOB SERPL: 1.3 {RATIO} (ref 1.1–2.2)
ALP SERPL-CCNC: 78 U/L (ref 40–129)
ALT SERPL-CCNC: 38 U/L (ref 10–40)
ANION GAP SERPL CALCULATED.3IONS-SCNC: 10 MMOL/L (ref 3–16)
AST SERPL-CCNC: 33 U/L (ref 15–37)
BASOPHILS # BLD: 0 K/UL (ref 0–0.2)
BASOPHILS NFR BLD: 0.4 %
BILIRUB SERPL-MCNC: 0.8 MG/DL (ref 0–1)
BUN SERPL-MCNC: 15 MG/DL (ref 7–20)
CALCIUM SERPL-MCNC: 9.3 MG/DL (ref 8.3–10.6)
CHLORIDE SERPL-SCNC: 100 MMOL/L (ref 99–110)
CO2 SERPL-SCNC: 27 MMOL/L (ref 21–32)
CREAT SERPL-MCNC: 1.1 MG/DL (ref 0.9–1.3)
DEPRECATED RDW RBC AUTO: 12.5 % (ref 12.4–15.4)
EOSINOPHIL # BLD: 0 K/UL (ref 0–0.6)
EOSINOPHIL NFR BLD: 0.1 %
FLUAV RNA RESP QL NAA+PROBE: NOT DETECTED
FLUBV RNA RESP QL NAA+PROBE: NOT DETECTED
GFR SERPLBLD CREATININE-BSD FMLA CKD-EPI: >60 ML/MIN/{1.73_M2}
GLUCOSE SERPL-MCNC: 102 MG/DL (ref 70–99)
HCT VFR BLD AUTO: 43.1 % (ref 40.5–52.5)
HGB BLD-MCNC: 15 G/DL (ref 13.5–17.5)
LYMPHOCYTES # BLD: 1.2 K/UL (ref 1–5.1)
LYMPHOCYTES NFR BLD: 14.3 %
MAGNESIUM SERPL-MCNC: 1.8 MG/DL (ref 1.8–2.4)
MCH RBC QN AUTO: 29.8 PG (ref 26–34)
MCHC RBC AUTO-ENTMCNC: 34.9 G/DL (ref 31–36)
MCV RBC AUTO: 85.3 FL (ref 80–100)
MONOCYTES # BLD: 0.5 K/UL (ref 0–1.3)
MONOCYTES NFR BLD: 6.5 %
NEUTROPHILS # BLD: 6.6 K/UL (ref 1.7–7.7)
NEUTROPHILS NFR BLD: 78.7 %
PLATELET # BLD AUTO: 181 K/UL (ref 135–450)
PMV BLD AUTO: 8.5 FL (ref 5–10.5)
POTASSIUM SERPL-SCNC: 4.3 MMOL/L (ref 3.5–5.1)
PROT SERPL-MCNC: 7.4 G/DL (ref 6.4–8.2)
RBC # BLD AUTO: 5.05 M/UL (ref 4.2–5.9)
S PYO AG THROAT QL: NEGATIVE
SARS-COV-2 RNA RESP QL NAA+PROBE: NOT DETECTED
SODIUM SERPL-SCNC: 137 MMOL/L (ref 136–145)
TROPONIN, HIGH SENSITIVITY: <6 NG/L (ref 0–22)
WBC # BLD AUTO: 8.4 K/UL (ref 4–11)

## 2023-10-30 PROCEDURE — 71045 X-RAY EXAM CHEST 1 VIEW: CPT

## 2023-10-30 PROCEDURE — 85025 COMPLETE CBC W/AUTO DIFF WBC: CPT

## 2023-10-30 PROCEDURE — 6370000000 HC RX 637 (ALT 250 FOR IP): Performed by: EMERGENCY MEDICINE

## 2023-10-30 PROCEDURE — 87636 SARSCOV2 & INF A&B AMP PRB: CPT

## 2023-10-30 PROCEDURE — 84484 ASSAY OF TROPONIN QUANT: CPT

## 2023-10-30 PROCEDURE — 96374 THER/PROPH/DIAG INJ IV PUSH: CPT

## 2023-10-30 PROCEDURE — 80053 COMPREHEN METABOLIC PANEL: CPT

## 2023-10-30 PROCEDURE — 87077 CULTURE AEROBIC IDENTIFY: CPT

## 2023-10-30 PROCEDURE — 87081 CULTURE SCREEN ONLY: CPT

## 2023-10-30 PROCEDURE — 87880 STREP A ASSAY W/OPTIC: CPT

## 2023-10-30 PROCEDURE — 83735 ASSAY OF MAGNESIUM: CPT

## 2023-10-30 PROCEDURE — 96375 TX/PRO/DX INJ NEW DRUG ADDON: CPT

## 2023-10-30 PROCEDURE — 93005 ELECTROCARDIOGRAM TRACING: CPT | Performed by: EMERGENCY MEDICINE

## 2023-10-30 PROCEDURE — 2580000003 HC RX 258: Performed by: EMERGENCY MEDICINE

## 2023-10-30 PROCEDURE — 6360000002 HC RX W HCPCS: Performed by: EMERGENCY MEDICINE

## 2023-10-30 PROCEDURE — 99285 EMERGENCY DEPT VISIT HI MDM: CPT

## 2023-10-30 RX ORDER — ACETAMINOPHEN 325 MG/1
650 TABLET ORAL ONCE
Status: COMPLETED | OUTPATIENT
Start: 2023-10-30 | End: 2023-10-30

## 2023-10-30 RX ORDER — METOCLOPRAMIDE 10 MG/1
10 TABLET ORAL 3 TIMES DAILY PRN
Qty: 9 TABLET | Refills: 0 | Status: SHIPPED | OUTPATIENT
Start: 2023-10-30 | End: 2023-10-31

## 2023-10-30 RX ORDER — 0.9 % SODIUM CHLORIDE 0.9 %
1000 INTRAVENOUS SOLUTION INTRAVENOUS ONCE
Status: COMPLETED | OUTPATIENT
Start: 2023-10-30 | End: 2023-10-30

## 2023-10-30 RX ORDER — DIPHENHYDRAMINE HYDROCHLORIDE 50 MG/ML
12.5 INJECTION INTRAMUSCULAR; INTRAVENOUS ONCE
Status: DISCONTINUED | OUTPATIENT
Start: 2023-10-30 | End: 2023-10-30

## 2023-10-30 RX ORDER — KETOROLAC TROMETHAMINE 30 MG/ML
15 INJECTION, SOLUTION INTRAMUSCULAR; INTRAVENOUS ONCE
Status: COMPLETED | OUTPATIENT
Start: 2023-10-30 | End: 2023-10-30

## 2023-10-30 RX ORDER — DOXYCYCLINE HYCLATE 100 MG
100 TABLET ORAL 2 TIMES DAILY
Qty: 16 TABLET | Refills: 0 | Status: SHIPPED | OUTPATIENT
Start: 2023-10-30 | End: 2023-11-07

## 2023-10-30 RX ORDER — DOXYCYCLINE HYCLATE 100 MG
100 TABLET ORAL ONCE
Status: COMPLETED | OUTPATIENT
Start: 2023-10-30 | End: 2023-10-30

## 2023-10-30 RX ORDER — METOCLOPRAMIDE HYDROCHLORIDE 5 MG/ML
10 INJECTION INTRAMUSCULAR; INTRAVENOUS ONCE
Status: COMPLETED | OUTPATIENT
Start: 2023-10-30 | End: 2023-10-30

## 2023-10-30 RX ADMIN — KETOROLAC TROMETHAMINE 15 MG: 30 INJECTION INTRAMUSCULAR; INTRAVENOUS at 21:08

## 2023-10-30 RX ADMIN — SODIUM CHLORIDE 1000 ML: 9 INJECTION, SOLUTION INTRAVENOUS at 21:04

## 2023-10-30 RX ADMIN — DOXYCYCLINE HYCLATE 100 MG: 100 TABLET, COATED ORAL at 22:43

## 2023-10-30 RX ADMIN — METOCLOPRAMIDE HYDROCHLORIDE 10 MG: 5 INJECTION INTRAMUSCULAR; INTRAVENOUS at 21:07

## 2023-10-30 RX ADMIN — ACETAMINOPHEN 650 MG: 325 TABLET ORAL at 21:06

## 2023-10-30 ASSESSMENT — PAIN DESCRIPTION - DESCRIPTORS: DESCRIPTORS: ACHING

## 2023-10-30 ASSESSMENT — PAIN SCALES - GENERAL
PAINLEVEL_OUTOF10: 8
PAINLEVEL_OUTOF10: 4

## 2023-10-30 ASSESSMENT — ENCOUNTER SYMPTOMS
SINUS PRESSURE: 1
ABDOMINAL PAIN: 0
SINUS PAIN: 1
NAUSEA: 1
CHEST TIGHTNESS: 0
COUGH: 0
SHORTNESS OF BREATH: 1
SORE THROAT: 1
VOMITING: 0

## 2023-10-30 ASSESSMENT — PAIN DESCRIPTION - LOCATION: LOCATION: HEAD

## 2023-10-30 ASSESSMENT — PAIN - FUNCTIONAL ASSESSMENT: PAIN_FUNCTIONAL_ASSESSMENT: NONE - DENIES PAIN

## 2023-10-31 ENCOUNTER — OFFICE VISIT (OUTPATIENT)
Dept: ORTHOPEDIC SURGERY | Age: 18
End: 2023-10-31
Payer: COMMERCIAL

## 2023-10-31 VITALS — WEIGHT: 240 LBS | HEIGHT: 73 IN | BODY MASS INDEX: 31.81 KG/M2

## 2023-10-31 DIAGNOSIS — M25.561 RIGHT KNEE PAIN, UNSPECIFIED CHRONICITY: ICD-10-CM

## 2023-10-31 DIAGNOSIS — M22.41 CHONDROMALACIA OF RIGHT PATELLA: Primary | ICD-10-CM

## 2023-10-31 LAB
EKG ATRIAL RATE: 115 BPM
EKG DIAGNOSIS: NORMAL
EKG P AXIS: 49 DEGREES
EKG P-R INTERVAL: 192 MS
EKG Q-T INTERVAL: 296 MS
EKG QRS DURATION: 86 MS
EKG QTC CALCULATION (BAZETT): 409 MS
EKG R AXIS: 49 DEGREES
EKG T AXIS: -22 DEGREES
EKG VENTRICULAR RATE: 115 BPM

## 2023-10-31 PROCEDURE — G8417 CALC BMI ABV UP PARAM F/U: HCPCS | Performed by: ORTHOPAEDIC SURGERY

## 2023-10-31 PROCEDURE — G8427 DOCREV CUR MEDS BY ELIG CLIN: HCPCS | Performed by: ORTHOPAEDIC SURGERY

## 2023-10-31 PROCEDURE — G8484 FLU IMMUNIZE NO ADMIN: HCPCS | Performed by: ORTHOPAEDIC SURGERY

## 2023-10-31 PROCEDURE — 93010 ELECTROCARDIOGRAM REPORT: CPT | Performed by: INTERNAL MEDICINE

## 2023-10-31 PROCEDURE — 1036F TOBACCO NON-USER: CPT | Performed by: ORTHOPAEDIC SURGERY

## 2023-10-31 PROCEDURE — 99204 OFFICE O/P NEW MOD 45 MIN: CPT | Performed by: ORTHOPAEDIC SURGERY

## 2023-10-31 RX ORDER — PROMETHAZINE HYDROCHLORIDE 25 MG/1
25 TABLET ORAL 3 TIMES DAILY PRN
Qty: 12 TABLET | Refills: 0 | Status: SHIPPED | OUTPATIENT
Start: 2023-10-31 | End: 2023-11-07

## 2023-10-31 RX ORDER — MELOXICAM 15 MG/1
15 TABLET ORAL DAILY PRN
Qty: 30 TABLET | Refills: 0 | Status: SHIPPED | OUTPATIENT
Start: 2023-10-31

## 2023-10-31 NOTE — ED PROVIDER NOTES
Review of Systems:  	•	CONSTITUTIONAL - no fever, no diaphoresis, no chills  	•	SKIN - no rash  	•	HEMATOLOGIC - no bleeding, no bruising  	•	EYES - no eye pain, no blurry vision  	•	ENT - no change in hearing, no sore throat, no ear pain or tinnitus  	•	RESPIRATORY - no shortness of breath, no cough  	•	CARDIAC - + chest pain, no palpitations  	•	GI - no abd pain, no nausea, no vomiting, no diarrhea, no constipation  	•	GENITO-URINARY - no discharge, no dysuria; no hematuria, no increased urinary frequency  	•	MUSCULOSKELETAL - no joint paint, no swelling, no redness  	•	NEUROLOGIC - no weakness, no headache, no paresthesias, no LOC  	•	PSYCH - no anxiety, non suicidal, non homicidal, no hallucination, no depression
words are mis-transcribed.)    Alida Mabry MD (electronically signed)            Alida Mabry MD  10/31/23 [id number]

## 2023-10-31 NOTE — PROGRESS NOTES
ORTHOPAEDIC SURGERY H&P / CONSULTATION NOTE    Chief complaint:   Chief Complaint   Patient presents with    Knee Pain     NP RT KNEE      History of present illness: The patient is a 19 y.o. male dominant with subjective symptoms of right knee pain. The chief complaint is located at anterior lateral aspect right knee. Duration of symptoms has been for 1 month. The severity of symptoms is rated at 3/10 pain on intake form. Patient states he had injured himself while he was doing some squats. He has anterior based knee pain. He states that he has previous injuries to the knee as well with working out. He denies gross patellar instability. He had been seen at the [facility] previously with therapy. Patient went to the ER on 10/26/23. The patient has tried the below listed items prior to today's consultation for above listed chief complaint.     -   Over-the-counter anti-inflammatories/prescription medication anti-inflammatory.      -   Physical therapy / guided home exercise program -     -   Previous corticosteroid injections    Past medical history:    Past Medical History:   Diagnosis Date    Anxiety     Depression     Seasonal allergies         Past surgical history:    Past Surgical History:   Procedure Laterality Date    ADENOIDECTOMY      TONSILLECTOMY      TYMPANOPLASTY          Allergies:  No Known Allergies      Medications:   Current Outpatient Medications:     meloxicam (MOBIC) 15 MG tablet, Take 1 tablet by mouth daily as needed for Pain, Disp: 30 tablet, Rfl: 0    promethazine (PHENERGAN) 25 MG tablet, Take 1 tablet by mouth 3 times daily as needed for Nausea, Disp: 12 tablet, Rfl: 0    doxycycline hyclate (VIBRA-TABS) 100 MG tablet, Take 1 tablet by mouth 2 times daily for 8 days, Disp: 16 tablet, Rfl: 0    ARIPiprazole (ABILIFY) 10 MG tablet, Take 1 tablet by mouth daily, Disp: , Rfl:     sertraline (ZOLOFT) 50 MG tablet, Take 1 tablet by mouth daily, Disp: , Rfl:

## 2023-10-31 NOTE — DISCHARGE INSTRUCTIONS
Take Tylenol or ibuprofen as needed for pain or fever. Take Reglan as needed for nausea. Take doxycycline due to concern for sinus infection. We did discuss possibility of lumbar puncture but at this time you are declining. With that being said, if you develop worsening headache with severe neck stiffness, numbness or weakness on one side of the body, worst headache of life, nonstop vomiting, or if you develop chest pain with shortness of breath, then return to the emergency department for further evaluation even if that means over the next 12 to 24 hours. Otherwise, see your primary doctor in 2 to 4 days for any other concerns and repeat evaluation, especially if symptoms or not improving.

## 2023-11-01 LAB — S PYO THROAT QL CULT: NORMAL

## 2023-11-02 ENCOUNTER — OFFICE VISIT (OUTPATIENT)
Dept: INTERNAL MEDICINE CLINIC | Age: 18
End: 2023-11-02
Payer: COMMERCIAL

## 2023-11-02 VITALS
TEMPERATURE: 97.3 F | SYSTOLIC BLOOD PRESSURE: 110 MMHG | HEART RATE: 74 BPM | WEIGHT: 242.8 LBS | DIASTOLIC BLOOD PRESSURE: 70 MMHG | BODY MASS INDEX: 32.03 KG/M2 | OXYGEN SATURATION: 97 %

## 2023-11-02 DIAGNOSIS — Z76.89 ENCOUNTER TO ESTABLISH CARE: Primary | ICD-10-CM

## 2023-11-02 DIAGNOSIS — M25.561 ACUTE PAIN OF RIGHT KNEE: ICD-10-CM

## 2023-11-02 DIAGNOSIS — F41.1 GAD (GENERALIZED ANXIETY DISORDER): ICD-10-CM

## 2023-11-02 DIAGNOSIS — E66.9 CLASS 1 OBESITY WITHOUT SERIOUS COMORBIDITY WITH BODY MASS INDEX (BMI) OF 32.0 TO 32.9 IN ADULT, UNSPECIFIED OBESITY TYPE: ICD-10-CM

## 2023-11-02 DIAGNOSIS — F32.0 CURRENT MILD EPISODE OF MAJOR DEPRESSIVE DISORDER WITHOUT PRIOR EPISODE (HCC): ICD-10-CM

## 2023-11-02 DIAGNOSIS — F90.9 ATTENTION DEFICIT HYPERACTIVITY DISORDER (ADHD), UNSPECIFIED ADHD TYPE: ICD-10-CM

## 2023-11-02 PROCEDURE — G8417 CALC BMI ABV UP PARAM F/U: HCPCS | Performed by: STUDENT IN AN ORGANIZED HEALTH CARE EDUCATION/TRAINING PROGRAM

## 2023-11-02 PROCEDURE — 99204 OFFICE O/P NEW MOD 45 MIN: CPT | Performed by: STUDENT IN AN ORGANIZED HEALTH CARE EDUCATION/TRAINING PROGRAM

## 2023-11-02 PROCEDURE — 1036F TOBACCO NON-USER: CPT | Performed by: STUDENT IN AN ORGANIZED HEALTH CARE EDUCATION/TRAINING PROGRAM

## 2023-11-02 PROCEDURE — G8427 DOCREV CUR MEDS BY ELIG CLIN: HCPCS | Performed by: STUDENT IN AN ORGANIZED HEALTH CARE EDUCATION/TRAINING PROGRAM

## 2023-11-02 PROCEDURE — G8484 FLU IMMUNIZE NO ADMIN: HCPCS | Performed by: STUDENT IN AN ORGANIZED HEALTH CARE EDUCATION/TRAINING PROGRAM

## 2023-11-02 SDOH — ECONOMIC STABILITY: FOOD INSECURITY: WITHIN THE PAST 12 MONTHS, THE FOOD YOU BOUGHT JUST DIDN'T LAST AND YOU DIDN'T HAVE MONEY TO GET MORE.: NEVER TRUE

## 2023-11-02 SDOH — ECONOMIC STABILITY: INCOME INSECURITY: HOW HARD IS IT FOR YOU TO PAY FOR THE VERY BASICS LIKE FOOD, HOUSING, MEDICAL CARE, AND HEATING?: NOT HARD AT ALL

## 2023-11-02 SDOH — ECONOMIC STABILITY: FOOD INSECURITY: WITHIN THE PAST 12 MONTHS, YOU WORRIED THAT YOUR FOOD WOULD RUN OUT BEFORE YOU GOT MONEY TO BUY MORE.: NEVER TRUE

## 2023-11-02 SDOH — ECONOMIC STABILITY: HOUSING INSECURITY
IN THE LAST 12 MONTHS, WAS THERE A TIME WHEN YOU DID NOT HAVE A STEADY PLACE TO SLEEP OR SLEPT IN A SHELTER (INCLUDING NOW)?: NO

## 2023-11-02 ASSESSMENT — PATIENT HEALTH QUESTIONNAIRE - PHQ9
1. LITTLE INTEREST OR PLEASURE IN DOING THINGS: 2
2. FEELING DOWN, DEPRESSED OR HOPELESS: 3
SUM OF ALL RESPONSES TO PHQ QUESTIONS 1-9: 20
SUM OF ALL RESPONSES TO PHQ QUESTIONS 1-9: 20
10. IF YOU CHECKED OFF ANY PROBLEMS, HOW DIFFICULT HAVE THESE PROBLEMS MADE IT FOR YOU TO DO YOUR WORK, TAKE CARE OF THINGS AT HOME, OR GET ALONG WITH OTHER PEOPLE: 2
8. MOVING OR SPEAKING SO SLOWLY THAT OTHER PEOPLE COULD HAVE NOTICED. OR THE OPPOSITE, BEING SO FIGETY OR RESTLESS THAT YOU HAVE BEEN MOVING AROUND A LOT MORE THAN USUAL: 2
3. TROUBLE FALLING OR STAYING ASLEEP: 3
9. THOUGHTS THAT YOU WOULD BE BETTER OFF DEAD, OR OF HURTING YOURSELF: 2
SUM OF ALL RESPONSES TO PHQ QUESTIONS 1-9: 18
SUM OF ALL RESPONSES TO PHQ QUESTIONS 1-9: 20
5. POOR APPETITE OR OVEREATING: 1
6. FEELING BAD ABOUT YOURSELF - OR THAT YOU ARE A FAILURE OR HAVE LET YOURSELF OR YOUR FAMILY DOWN: 3
SUM OF ALL RESPONSES TO PHQ9 QUESTIONS 1 & 2: 5
7. TROUBLE CONCENTRATING ON THINGS, SUCH AS READING THE NEWSPAPER OR WATCHING TELEVISION: 1
4. FEELING TIRED OR HAVING LITTLE ENERGY: 3

## 2023-11-02 ASSESSMENT — COLUMBIA-SUICIDE SEVERITY RATING SCALE - C-SSRS
4. HAVE YOU HAD THESE THOUGHTS AND HAD SOME INTENTION OF ACTING ON THEM?: NO
5. HAVE YOU STARTED TO WORK OUT OR WORKED OUT THE DETAILS OF HOW TO KILL YOURSELF? DO YOU INTEND TO CARRY OUT THIS PLAN?: NO
6. HAVE YOU EVER DONE ANYTHING, STARTED TO DO ANYTHING, OR PREPARED TO DO ANYTHING TO END YOUR LIFE?: YES
1. WITHIN THE PAST MONTH, HAVE YOU WISHED YOU WERE DEAD OR WISHED YOU COULD GO TO SLEEP AND NOT WAKE UP?: YES
2. HAVE YOU ACTUALLY HAD ANY THOUGHTS OF KILLING YOURSELF?: YES
7. DID THIS OCCUR IN THE LAST THREE MONTHS: NO
3. HAVE YOU BEEN THINKING ABOUT HOW YOU MIGHT KILL YOURSELF?: YES

## 2023-11-02 NOTE — PROGRESS NOTES
[facility]  2023    Doug Duke (:  2005) is a 19 y.o. male, here for evaluation of the following medical concerns:    Chief Complaint   Patient presents with    New Patient        ASSESSMENT/ PLAN  1. Encounter to establish care  Reviewed, emergency room summary, orthopedic notes    2. Attention deficit hyperactivity disorder (ADHD), unspecified ADHD type  Currently on Adderall. Reports of stable mood. Has been following up with psych NP and wants to establish care this side of [facility] . advised to set up care with [facility] behavioral health at [facility]. Contact information provided to patient. Advised to call to make an appointment    3. Current mild episode of major depressive disorder without prior episode ([address])  -Reports of stable mood at this time. Currently on Abilify and Zoloft. Continue medications. Advised to set up care with behavioral health    4. JOJO (generalized anxiety disorder)  -Stable as above    5. Acute pain of right knee  -Reviewed recent orthopedic visit summary. Continue Mobic and PT    6. Class 1 obesity without serious comorbidity with body mass index (BMI) of 32.0 to 32.9 in adult, unspecified obesity type  -BMI of 32.03.   Counseled on diet weight loss and exercise     Return for yearly physical exam    Lab Review   Admission on 10/30/2023, Discharged on 10/30/2023   Component Date Value    SARS-CoV-2 RNA, RT PCR 10/30/2023 NOT DETECTED     INFLUENZA A 10/30/2023 NOT DETECTED     INFLUENZA B 10/30/2023 NOT DETECTED     WBC 10/30/2023 8.4     RBC 10/30/2023 5.05     Hemoglobin 10/30/2023 15.0     Hematocrit 10/30/2023 43.1     MCV 10/30/2023 85.3     MCH 10/30/2023 29.8     MCHC 10/30/2023 34.9     RDW 10/30/2023 12.5     Platelets  181     MPV 10/30/2023 8.5     Neutrophils % 10/30/2023 78.7     Lymphocytes % 10/30/2023 14.3     Monocytes % 10/30/2023 6.5     Eosinophils % 10/30/2023 0.1     Basophils % 10/30/2023 0.4     Neutrophils Absolute 10/30/2023 all other ROS negative except as per HPI

## 2023-11-14 ENCOUNTER — HOSPITAL ENCOUNTER (OUTPATIENT)
Dept: PHYSICAL THERAPY | Age: 18
Setting detail: THERAPIES SERIES
Discharge: HOME | End: 2023-11-14
Attending: ORTHOPAEDIC SURGERY
Payer: COMMERCIAL

## 2023-11-14 PROCEDURE — 97161 PT EVAL LOW COMPLEX 20 MIN: CPT | Performed by: PHYSICAL THERAPIST

## 2023-11-14 PROCEDURE — 97110 THERAPEUTIC EXERCISES: CPT | Performed by: PHYSICAL THERAPIST

## 2023-11-14 NOTE — THERAPY EVALUATION
conditions  [] Disc pathology   [] Congenital spine pathologies   [] Osteoporosis (M81.8)  [] Osteopenia (M85.8)  [] Scoliosis    Cardio/Pulmonary conditions  [] Asthma (J45)  [] Coughing   [] COPD (J44.9)  [] CHF  [] A-fib      Rheumatological conditions  [] Fibromyalgia (M79.7)  [] Lupus  [] Sjogrens  [] Ankylosing spondylitis  [] Other autoimmune       Metabolic conditions  [] Morbid obesity (E66.01)  [] Diabetes type 1(E10.65) or 2 (E11.65)   [] Neuropathy (G60.9)        Cardiovascular conditions  [] Hypertension (I10)  [] Hyperlipidemia (E78.5)  [] Angina pectoris (I20)  [] Atherosclerosis (I70)  [] Pacemaker/Defib  [] Hx of CABG/stent/cardiac surgeries    Developmental Disorders  [] Autism (F84.0)  [] Cerebral Palsy (G80)  [] Down Syndrome (Q90.9)  [] Developmental delay     Psychological Disorders  [x] Anxiety (F41.9)  [x] Depression (F32.9)   [] Bipolar  [] Other:      Prior surgeries  [] Involved limb  [] Previous spinal surgery  []  section birth  [] Hysterectomy  [] Bowel / bladder surgery  [] Other relevant surgeries    Other conditions  [] Vertigo  [] Syncope  [] Kidney Failure  [] Cancer  [] Pregnancy  [] Incontinence  [] Seizures/Epilepsy     Other Co-morbidities not listed:       OBJECTIVE EXAMINATION        Mvmt (norm) AROM L AROM R Notes PROM L PROM R Notes                           HIP Flex (120)          Abd (45)          ER (50)          IR (45)          Ext (20)                   KNEE Flex (140) WNL WNL        Ext (0) 0 0                     MMT L MMT R Notes       HIP Flexion 5 5     Abduction 5 4     ER       IR       Extension             KNEE Flexion 5 5     Extension 5 5             ANKLE DF 5 5     PF       Inversion       Eversion        Repeated Movements: [] Normal  [] Abnormal [] N/A    Palpation:   Patient reported tenderness with palpation  Location:quad tendon, ITB insertion, lateral ret    Posture:    WNL    Bandages/Dressings/Incisions:  Not Applicable    Dermatomes:

## 2023-11-14 NOTE — FLOWSHEET NOTE
[address] [facility]  [address] office: [phone] fax: [phone]      Physical Therapy: TREATMENT/PROGRESS NOTE   Patient: Karen Robertson (4 y.o. 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]   Referring Physician: Meredith Parker MD  PCP: Mariela Guerra MD                             Plan of care signed (Y/N):     Date of Patient follow up with Physician:      Progress Report/POC: EVAL today  POC update due: (10 visits /OR [address], whichever is less)  2023       Preferred Language for Healthcare:   [x]English       []other:    SUBJECTIVE EXAMINATION     Patient Report/Comments: see eval     Test used Initial score  23   Pain Summary VAS 2-6    Functional questionnaire LEFS 62 = 23%     Other:                OBJECTIVE EXAMINATION     Observation:     Test measurements: see eval    Exercises/Interventions:     Therapeutic Ex (12609)  resistance Sets/time Reps Notes/Cues/Progressions          Supine HS s/   supine ITB s  :20 2x hep   Bridges  X 15  hep   clamshells GR X 20  hep   SL hip abd  2 x 10      Seated HS s :20 3x  hep   Lateral band walk GR X 20      Fig 4                            Manual Intervention (41646)  TIME                                        NMR re-education (68898) resistance Sets/time Reps CUES NEEDED                                      Therapeutic Activity (16087)  Sets/time                                          Modalities:    No modalities applied this

## 2023-11-21 ENCOUNTER — HOSPITAL ENCOUNTER (OUTPATIENT)
Dept: PHYSICAL THERAPY | Age: 18
Setting detail: THERAPIES SERIES
Discharge: HOME | End: 2023-11-21
Attending: ORTHOPAEDIC SURGERY
Payer: COMMERCIAL

## 2023-11-21 NOTE — FLOWSHEET NOTE
[address] [facility], [address] [place] [address]  [address] [place] [address], [address] [place] [address]        Physical Therapy  Cancellation/No-show Note  Patient Name:  Ryan Benedict  :  2005   Date:  2023  Cancelled visits to date: 0  No-shows to date: 1    For today's appointment patient:  []  Cancelled  []  Rescheduled appointment  [x]  No-show     Reason given by patient:  []  Patient ill  []  Conflicting appointment  []  No transportation    []  Conflict with work  []  No reason given  []  Other:     Comments:  attempted to leave message, but his mailbox was full      Electronically signed by:  [facility], PT

## 2023-11-27 ENCOUNTER — HOSPITAL ENCOUNTER (OUTPATIENT)
Dept: PHYSICAL THERAPY | Age: 18
Setting detail: THERAPIES SERIES
Discharge: HOME | End: 2023-11-27
Attending: ORTHOPAEDIC SURGERY
Payer: COMMERCIAL

## 2023-11-27 NOTE — FLOWSHEET NOTE
[address] [facility], [address] [place] [address]  [address] [place] [address], [address] [place] [address], [address] [place] [address]        Physical Therapy  Cancellation/No-show Note  Patient Name:  Stephany Bosch  :  2005   Date:  2023  Cancelled visits to date: 0  No-shows to date: 2    For today's appointment patient:  []  Cancelled  []  Rescheduled appointment  [x]  No-show     Reason given by patient:  []  Patient ill  []  Conflicting appointment  []  No transportation    []  Conflict with work  []  No reason given  []  Other:     Comments:  attempted to leave message, but his mailbox was full      Electronically signed by:  Bernardo Coreas PT

## 2023-12-01 ENCOUNTER — APPOINTMENT (OUTPATIENT)
Dept: PHYSICAL THERAPY | Age: 18
End: 2023-12-01
Attending: ORTHOPAEDIC SURGERY
Payer: COMMERCIAL

## 2023-12-04 ENCOUNTER — APPOINTMENT (OUTPATIENT)
Dept: PHYSICAL THERAPY | Age: 18
End: 2023-12-04
Attending: ORTHOPAEDIC SURGERY
Payer: COMMERCIAL

## 2023-12-04 ENCOUNTER — HOSPITAL ENCOUNTER (OUTPATIENT)
Dept: PHYSICAL THERAPY | Age: 18
Setting detail: THERAPIES SERIES
Discharge: HOME | End: 2023-12-04
Attending: ORTHOPAEDIC SURGERY
Payer: COMMERCIAL

## 2023-12-04 PROCEDURE — 97110 THERAPEUTIC EXERCISES: CPT | Performed by: PHYSICAL THERAPIST

## 2023-12-04 PROCEDURE — 97112 NEUROMUSCULAR REEDUCATION: CPT | Performed by: PHYSICAL THERAPIST

## 2023-12-04 NOTE — FLOWSHEET NOTE
[address] [facility]  [address] office: [phone] fax: [phone]      Physical Therapy: TREATMENT/PROGRESS NOTE   Patient: Jeremy Lowe (41 y.o. male)   Treatment Date: 2023   :  2005 MRN: [id number]   Visit #: 2   Insurance Allowable Auth Needed   12 (-23) [x]Yes    []No    Insurance: Payor: Jeffery Guzmán / Plan: [address]  / Product Type: *No Product type* /   Insurance ID: [id number] - (Commercial)  Secondary Insurance (if applicable): Young Reardon   Treatment Diagnosis:   Right knee pain M25.561,  right hip weakness    Medical Diagnosis:    Chondromalacia of right patella [M22.41]   Referring Physician: Melissa Schaefer MD  PCP: Papa Antonio MD                             Plan of care signed (Y/N):     Date of Patient follow up with Physician:      Progress Report/POC: NO  POC update due: (10 visits [address], whichever is less)  2023       Preferred Language for Healthcare:   [x]English       []other:    SUBJECTIVE EXAMINATION     Patient Report/Comments: Patient has missed the last couple of appointments due to new job. He works night shifts at [employer]. He reports he still has pain to the touch just above the kneecap. He does notice soreness with squatting at work.        Test used Initial score  23   Pain Summary VAS 2-6    Functional questionnaire LEFS 62 = 23%     Other:                OBJECTIVE EXAMINATION     Observation:     Test measurements: see eval    Exercises/Interventions:     Therapeutic Ex (32127) Ulyess Cuna re-education (64002) resistance Sets/time Reps Notes/Cues/Progressions          Supine HS s/   supine ITB s hep   Bridges hep   clamshells hep   SL hip abd    Seated HS s hep   Lateral band walk GR 3x up and back     Fig 4       SAQ with add squeeze 3# 3 sec 2x10    TKE 45# 3 sec     Hip abd each leg 45#  2x10     RDL 13#  x10

## 2023-12-06 ENCOUNTER — APPOINTMENT (OUTPATIENT)
Dept: PHYSICAL THERAPY | Age: 18
End: 2023-12-06
Attending: ORTHOPAEDIC SURGERY
Payer: COMMERCIAL

## 2023-12-11 ENCOUNTER — APPOINTMENT (OUTPATIENT)
Dept: PHYSICAL THERAPY | Age: 18
End: 2023-12-11
Attending: ORTHOPAEDIC SURGERY
Payer: COMMERCIAL

## 2023-12-13 ENCOUNTER — APPOINTMENT (OUTPATIENT)
Dept: PHYSICAL THERAPY | Age: 18
End: 2023-12-13
Attending: ORTHOPAEDIC SURGERY
Payer: COMMERCIAL

## 2023-12-15 ENCOUNTER — HOSPITAL ENCOUNTER (OUTPATIENT)
Dept: PHYSICAL THERAPY | Age: 18
Setting detail: THERAPIES SERIES
Discharge: HOME | End: 2023-12-15
Attending: ORTHOPAEDIC SURGERY
Payer: COMMERCIAL

## 2023-12-15 PROCEDURE — 97112 NEUROMUSCULAR REEDUCATION: CPT | Performed by: PHYSICAL THERAPIST

## 2023-12-15 PROCEDURE — 97110 THERAPEUTIC EXERCISES: CPT | Performed by: PHYSICAL THERAPIST

## 2023-12-15 NOTE — PLAN OF CARE
[address] [facility]  [address], [address] [place] [address] office: [phone] fax: [phone]    Physical Therapy Re-Certification Plan of Care    Dear Yana Rucker MD  ,    We had the pleasure of treating the following patient for physical therapy services at [address] [place] [address]. A summary of our findings can be found in the updated assessment below. This includes our plan of care. If you have any questions or concerns regarding these findings, please do not hesitate to contact me at the office phone number checked above. Thank you for the referral.     Physician Signature:________________________________Date:__________________  By signing above (or electronic signature), therapist's plan is approved by physician      Functional Outcome:   Jamari Alfred 2005 continues to present with functional deficits in strength symmetry  limiting ability with lifting items . During therapy this date, patient required visual cueing, verbal cueing, tactile cueing, modification of technique, and progression of exercises and program for exercise progression, improving proper muscle recruitment and activation/motor control patterns, and modulating pain. Patient will continue to benefit from ongoing evaluation and advanced clinical decision from a Physical Therapist to improve pain control, muscle strength, and proper body mechanics to safely return to PLOF without symptoms or restrictions.     Overall Response to Treatment:   []Patient is responding well to treatment and improvement is noted with regards to goals   []Patient should continue to improve in reasonable time if they continue HEP   []Patient has plateaued and is no longer responding to skilled PT intervention    []Patient is getting worse and would benefit from return to referring MD   []Patient unable to adhere to initial POC   [x]Other: Patient has only been to PT 3x due

## 2024-01-27 ENCOUNTER — HOSPITAL ENCOUNTER (EMERGENCY)
Age: 19
Discharge: HOME | End: 2024-01-27
Payer: COMMERCIAL

## 2024-01-27 VITALS
TEMPERATURE: 98.1 F | HEIGHT: 73 IN | DIASTOLIC BLOOD PRESSURE: 74 MMHG | HEART RATE: 97 BPM | BODY MASS INDEX: 31.81 KG/M2 | SYSTOLIC BLOOD PRESSURE: 146 MMHG | OXYGEN SATURATION: 96 % | RESPIRATION RATE: 20 BRPM | WEIGHT: 240 LBS

## 2024-01-27 DIAGNOSIS — U07.1 COVID-19: Primary | ICD-10-CM

## 2024-01-27 LAB
FLUAV RNA RESP QL NAA+PROBE: NOT DETECTED
FLUBV RNA RESP QL NAA+PROBE: NOT DETECTED
SARS-COV-2 RNA RESP QL NAA+PROBE: DETECTED

## 2024-01-27 PROCEDURE — 99283 EMERGENCY DEPT VISIT LOW MDM: CPT

## 2024-01-27 PROCEDURE — 87636 SARSCOV2 & INF A&B AMP PRB: CPT

## 2024-01-27 PROCEDURE — 6370000000 HC RX 637 (ALT 250 FOR IP): Performed by: NURSE PRACTITIONER

## 2024-01-27 RX ORDER — IBUPROFEN 800 MG/1
800 TABLET ORAL ONCE
Status: COMPLETED | OUTPATIENT
Start: 2024-01-27 | End: 2024-01-27

## 2024-01-27 RX ADMIN — IBUPROFEN 800 MG: 800 TABLET, FILM COATED ORAL at 15:01

## 2024-01-27 ASSESSMENT — PAIN SCALES - GENERAL
PAINLEVEL_OUTOF10: 3
PAINLEVEL_OUTOF10: 4

## 2024-01-27 ASSESSMENT — PAIN DESCRIPTION - LOCATION
LOCATION: GENERALIZED
LOCATION: GENERALIZED

## 2024-01-27 ASSESSMENT — PAIN DESCRIPTION - DESCRIPTORS: DESCRIPTORS: ACHING

## 2024-01-27 ASSESSMENT — PAIN - FUNCTIONAL ASSESSMENT: PAIN_FUNCTIONAL_ASSESSMENT: 0-10

## 2024-01-27 NOTE — ED PROVIDER NOTES
[facility]  ED  EMERGENCY DEPARTMENT ENCOUNTER        Pt Name: Doug Serna  MRN: [id number]  Birthdate 2005  Date of evaluation: 1/27/2024  Provider: LAINE Marie - PELON  PCP: Freddy Saldaña MD  Note Started: 2:57 PM EST 1/27/24    Evaluated by LAKSHMI. I have evaluated this patient.  My supervising physician was available for consultation.      CHIEF COMPLAINT       Chief Complaint   Patient presents with    Concern For COVID-19     Patient to the ED for COVID symptoms that started yesterday. Reports cough, congestion, and sneezing.        HISTORY OF PRESENT ILLNESS: 1 or more Elements     History From: Patient  Limitations to history : None    Doug Serna is a 18 y.o. male who presents to ER with concern for COVID. Feels really really sick, throat has a tickle feeling, weak, hurts all over. Symptoms just started yesterday-coughing and sneezing, woke up this morning and couldn't move.  He is concerned he has COVID, co-worker tested positive. Denies fevers, chills, sweats. A week ago he thought he had strep, throat hurt really bad and was coughing but it got better. Denies CP or SOB. Reports a little bit of nausea, thinks it is from the headache. No abdominal pain, vomiting or diarrhea. Has not taken anything for symptoms, did not take dayquil due to concern it would mess with his symptoms. He did take motrin for the headache around 8am, it did help the headache and nausea.     Nursing Notes were all reviewed and agreed with or any disagreements were addressed in the HPI.    REVIEW OF SYSTEMS :      Review of Systems    Positives and Pertinent negatives as per HPI.     MEDICAL HISTORY     Past Medical History:   Diagnosis Date    Anxiety     Depression     Seasonal allergies        SURGICAL HISTORY     Past Surgical History:   Procedure Laterality Date    ADENOIDECTOMY      TONSILLECTOMY      TYMPANOPLASTY         CURRENTMEDICATIONS       Previous Medications     done, Admit vs D/C, Shared Decision Making, Pt Expectation of Test or Tx.): I estimate there is LOW risk for EPIGLOTTITIS, PNEUMONIA, MENINGITIS, COVID, INFLUENZA, STREP PHARYNGITIS, , thus I consider the discharge disposition reasonable. Also, there is no evidence or peritonitis, sepsis, or toxicity. oDug Serna and I have discussed the diagnosis and risks, and we agree with discharging home to follow-up with their primary doctor. We also discussed returning to the Emergency Department immediately if new or worsening symptoms occur. We have discussed the symptoms which are most concerning (e.g., changing or worsening pain, trouble swallowing or breating, neck stiffness, fever) that necessitate immediate return.       I am the Primary Clinician of Record.  FINAL IMPRESSION      1. COVID-19          DISPOSITION/PLAN     DISPOSITION Decision To Discharge 01/27/2024 03:25:43 PM      PATIENT REFERRED TO:  Freddy Saldaña MD  [address] [facility] [address]  [facility] [address]  [phone]    Call in 3 days  For further evaluation    [facility]  ED  [address] [facility] [address] [phone]  Go to   If symptoms worsen      DISCHARGE MEDICATIONS:  New Prescriptions    No medications on file       DISCONTINUED MEDICATIONS:  Discontinued Medications    No medications on file              (Please note that portions of this note were completed with a voice recognition program.  Efforts were made to edit the dictations but occasionally words are mis-transcribed.)    LAINE Marie CNP (electronically signed)       Emilia Beebe APRN - CNP  01/27/24 7471

## 2024-02-02 ENCOUNTER — OFFICE VISIT (OUTPATIENT)
Dept: ORTHOPEDIC SURGERY | Age: 19
End: 2024-02-02
Payer: COMMERCIAL

## 2024-02-02 VITALS — WEIGHT: 240 LBS | BODY MASS INDEX: 31.81 KG/M2 | HEIGHT: 73 IN

## 2024-02-02 DIAGNOSIS — M76.899 QUADRICEPS TENDINITIS: ICD-10-CM

## 2024-02-02 DIAGNOSIS — M22.41 CHONDROMALACIA OF RIGHT PATELLA: Primary | ICD-10-CM

## 2024-02-02 PROCEDURE — 99213 OFFICE O/P EST LOW 20 MIN: CPT | Performed by: ORTHOPAEDIC SURGERY

## 2024-02-02 PROCEDURE — 1036F TOBACCO NON-USER: CPT | Performed by: ORTHOPAEDIC SURGERY

## 2024-02-02 PROCEDURE — G8484 FLU IMMUNIZE NO ADMIN: HCPCS | Performed by: ORTHOPAEDIC SURGERY

## 2024-02-02 PROCEDURE — G8417 CALC BMI ABV UP PARAM F/U: HCPCS | Performed by: ORTHOPAEDIC SURGERY

## 2024-02-02 PROCEDURE — G8428 CUR MEDS NOT DOCUMENT: HCPCS | Performed by: ORTHOPAEDIC SURGERY

## 2024-02-02 NOTE — PROGRESS NOTES
FOLLOW UP ORTHOPAEDIC NOTE    The patient follows up today for reevaluation of right knee. The patient states right knee pain doing better at 1/10.  He received his MRI and is here to discuss results     PE:  AAOx3  RR  Unlabored breathing  Skin warm and moist  Focused physical examination of the right knee  Slight tenderness to palpation quadriceps insertion midline.  Trace Parviz's, positive medial lateral patellar facet tenderness  Remainder of examination unchanged    Pertinent radiographs/imaging:  MRI right knee 1/10/2024    MY READ: ACL PCL LCL MCL intact.  No gross medial or lateral meniscus tear.  Positive chondromalacia medial lateral patellar facet.  TT-TG 6 mm.  Positive tendinosis of the distal quadriceps insertion without gross tear.     Diagnosis Orders   1. Chondromalacia of right patella  Ambulatory referral to Physical Therapy      2. Quadriceps tendinitis  Ambulatory referral to Physical Therapy          Assessment and plan: 18 male with continued subjective symptoms of right knee pain with known, correlating diagnosis of right knee patellar chondromalacia and quadriceps tendinosis/tendinitis.  -Time of 12 minutes was spent coordinating and discussing the clinical findings and diagnostic imaging results as they pertain to the patient's presenting subjective symptoms.  -I had a pleasant discussion with the patient today and reviewed with the patient nonoperative versus operative measures.  Understand the risk and benefits of nonoperative versus operative care with regard to risks and benefits associated with each with the above listed diagnoses, currently the patient wishes to proceed with conservative nonoperative care.  I reviewed with him the overall findings of his advanced imaging MRI which does not reveal any significant quadriceps tear however there is associated tendinosis at its insertion in the setting of patellar chondromalacia  -Formal physical therapy has been prescribed to work on

## 2024-03-26 ENCOUNTER — OFFICE VISIT (OUTPATIENT)
Dept: INTERNAL MEDICINE CLINIC | Age: 19
End: 2024-03-26
Payer: COMMERCIAL

## 2024-03-26 ENCOUNTER — HOSPITAL ENCOUNTER (OUTPATIENT)
Age: 19
Discharge: HOME | End: 2024-03-26

## 2024-03-26 VITALS — TEMPERATURE: 97.4 F | BODY MASS INDEX: 32.19 KG/M2 | OXYGEN SATURATION: 97 % | HEART RATE: 96 BPM | WEIGHT: 244 LBS

## 2024-03-26 DIAGNOSIS — R21 RASH: ICD-10-CM

## 2024-03-26 DIAGNOSIS — R21 RASH: Primary | ICD-10-CM

## 2024-03-26 PROBLEM — R29.898 POOR BODY MECHANICS: Status: ACTIVE | Noted: 2019-11-25

## 2024-03-26 PROBLEM — S62.231A CLOSED DISPLACED FRACTURE OF BASE OF FIRST METACARPAL BONE OF RIGHT HAND: Status: ACTIVE | Noted: 2018-04-23

## 2024-03-26 LAB
ALBUMIN SERPL-MCNC: 4.3 G/DL (ref 3.4–5)
ALBUMIN/GLOB SERPL: 1.5 {RATIO} (ref 1.1–2.2)
ALP SERPL-CCNC: 83 U/L (ref 40–129)
ALT SERPL-CCNC: 27 U/L (ref 10–40)
ANION GAP SERPL CALCULATED.3IONS-SCNC: 9 MMOL/L (ref 3–16)
AST SERPL-CCNC: 26 U/L (ref 15–37)
BASOPHILS # BLD: 0 K/UL (ref 0–0.2)
BASOPHILS NFR BLD: 0.4 %
BILIRUB SERPL-MCNC: 0.6 MG/DL (ref 0–1)
BUN SERPL-MCNC: 16 MG/DL (ref 7–20)
CALCIUM SERPL-MCNC: 9.8 MG/DL (ref 8.3–10.6)
CHLORIDE SERPL-SCNC: 107 MMOL/L (ref 99–110)
CO2 SERPL-SCNC: 26 MMOL/L (ref 21–32)
CREAT SERPL-MCNC: 0.9 MG/DL (ref 0.9–1.3)
DEPRECATED RDW RBC AUTO: 12.9 % (ref 12.4–15.4)
EOSINOPHIL # BLD: 0.2 K/UL (ref 0–0.6)
EOSINOPHIL NFR BLD: 3.1 %
ERYTHROCYTE [SEDIMENTATION RATE] IN BLOOD BY WESTERGREN METHOD: 3 MM/HR (ref 0–15)
GFR SERPLBLD CREATININE-BSD FMLA CKD-EPI: >90 ML/MIN/{1.73_M2}
GLUCOSE SERPL-MCNC: 92 MG/DL (ref 70–99)
HCT VFR BLD AUTO: 46.3 % (ref 40.5–52.5)
HGB BLD-MCNC: 16.3 G/DL (ref 13.5–17.5)
LYMPHOCYTES # BLD: 3.3 K/UL (ref 1–5.1)
LYMPHOCYTES NFR BLD: 50.3 %
MCH RBC QN AUTO: 29.4 PG (ref 26–34)
MCHC RBC AUTO-ENTMCNC: 35.2 G/DL (ref 31–36)
MCV RBC AUTO: 83.5 FL (ref 80–100)
MONOCYTES # BLD: 0.4 K/UL (ref 0–1.3)
MONOCYTES NFR BLD: 6.4 %
NEUTROPHILS # BLD: 2.6 K/UL (ref 1.7–7.7)
NEUTROPHILS NFR BLD: 39.8 %
PLATELET # BLD AUTO: 235 K/UL (ref 135–450)
PMV BLD AUTO: 10 FL (ref 5–10.5)
POTASSIUM SERPL-SCNC: 3.9 MMOL/L (ref 3.5–5.1)
PROT SERPL-MCNC: 7.1 G/DL (ref 6.4–8.2)
RBC # BLD AUTO: 5.55 M/UL (ref 4.2–5.9)
SODIUM SERPL-SCNC: 142 MMOL/L (ref 136–145)
TSH SERPL DL<=0.005 MIU/L-ACNC: 2.02 UIU/ML (ref 0.27–4.2)
WBC # BLD AUTO: 6.5 K/UL (ref 4–11)

## 2024-03-26 PROCEDURE — 84443 ASSAY THYROID STIM HORMONE: CPT

## 2024-03-26 PROCEDURE — 99213 OFFICE O/P EST LOW 20 MIN: CPT | Performed by: STUDENT IN AN ORGANIZED HEALTH CARE EDUCATION/TRAINING PROGRAM

## 2024-03-26 PROCEDURE — 85652 RBC SED RATE AUTOMATED: CPT

## 2024-03-26 PROCEDURE — 85025 COMPLETE CBC W/AUTO DIFF WBC: CPT

## 2024-03-26 PROCEDURE — 80053 COMPREHEN METABOLIC PANEL: CPT

## 2024-03-26 PROCEDURE — 83036 HEMOGLOBIN GLYCOSYLATED A1C: CPT

## 2024-03-26 PROCEDURE — 36415 COLL VENOUS BLD VENIPUNCTURE: CPT

## 2024-03-26 RX ORDER — PREDNISONE 20 MG/1
20 TABLET ORAL DAILY
Qty: 5 TABLET | Refills: 0 | Status: SHIPPED | OUTPATIENT
Start: 2024-03-26 | End: 2024-03-30

## 2024-03-26 RX ORDER — BENZOCAINE/MENTHOL 6 MG-10 MG
LOZENGE MUCOUS MEMBRANE
Qty: 30 G | Refills: 1 | Status: SHIPPED | OUTPATIENT
Start: 2024-03-26 | End: 2024-04-02

## 2024-03-26 ASSESSMENT — PATIENT HEALTH QUESTIONNAIRE - PHQ9
2. FEELING DOWN, DEPRESSED OR HOPELESS: NOT AT ALL
5. POOR APPETITE OR OVEREATING: NOT AT ALL
7. TROUBLE CONCENTRATING ON THINGS, SUCH AS READING THE NEWSPAPER OR WATCHING TELEVISION: NOT AT ALL
9. THOUGHTS THAT YOU WOULD BE BETTER OFF DEAD, OR OF HURTING YOURSELF: NOT AT ALL
SUM OF ALL RESPONSES TO PHQ9 QUESTIONS 1 & 2: 0
SUM OF ALL RESPONSES TO PHQ QUESTIONS 1-9: 0
SUM OF ALL RESPONSES TO PHQ QUESTIONS 1-9: 0
6. FEELING BAD ABOUT YOURSELF - OR THAT YOU ARE A FAILURE OR HAVE LET YOURSELF OR YOUR FAMILY DOWN: NOT AT ALL
SUM OF ALL RESPONSES TO PHQ QUESTIONS 1-9: 0
8. MOVING OR SPEAKING SO SLOWLY THAT OTHER PEOPLE COULD HAVE NOTICED. OR THE OPPOSITE, BEING SO FIGETY OR RESTLESS THAT YOU HAVE BEEN MOVING AROUND A LOT MORE THAN USUAL: NOT AT ALL
4. FEELING TIRED OR HAVING LITTLE ENERGY: NOT AT ALL
10. IF YOU CHECKED OFF ANY PROBLEMS, HOW DIFFICULT HAVE THESE PROBLEMS MADE IT FOR YOU TO DO YOUR WORK, TAKE CARE OF THINGS AT HOME, OR GET ALONG WITH OTHER PEOPLE: NOT DIFFICULT AT ALL
3. TROUBLE FALLING OR STAYING ASLEEP: NOT AT ALL
SUM OF ALL RESPONSES TO PHQ QUESTIONS 1-9: 0
1. LITTLE INTEREST OR PLEASURE IN DOING THINGS: NOT AT ALL

## 2024-03-26 NOTE — PROGRESS NOTES
Jacob   3/26/2024    Doug Serna (:  2005) is a 18 y.o. male, here for evaluation of the following medical concerns:    Chief Complaint   Patient presents with    itching        ASSESSMENT/ PLAN  1. Rash  -Rash is most limited to flexural areas suspect contact dermatitis.  Patient works at [employer] and could have been exposed to allergens from packages.  Advised long protective clothing.  Start on topical steroids.  Will start on oral prednisone.  If symptoms worsen will need to see dermatology.  Obtain blood work.  Has failed course of Benadryl at home  - predniSONE (DELTASONE) 20 MG tablet; Take 1 tablet by mouth daily for 5 days  Dispense: 5 tablet; Refill: 0  - CBC with Auto Differential; Future  - Comprehensive Metabolic Panel; Future  - Sedimentation Rate; Future  - TSH with Reflex to FT4 ([place]antt Only); Future  - Hemoglobin A1C; Future  - hydrocortisone (ALA-KIMMIE) 1 % cream; Apply topically 2 times daily.  Dispense: 30 g; Refill: 1    HPI  Patient is a 18-year-old male presenting with rashes.  This has been ongoing for the last 2 days.  He works at [employer] and noted he first noticed a small rash on his right arm which then started to have vesicles. he is not currently taking any medications, no recent changes in diet, no changes in shampoo, soap, body wash, laundry detergents.  Has not been outdoors.  He has had multiple episodes of ivy dermatitis in the past.  This does not feel like it for him.  The rash is spread to his left back.  Described it is itchy.  Denies any chest pain, shortness of breath, oral swelling, facial swelling, penile discharge, high risk sexual activity.  He has tried Benadryl without any relief    ROS    CONSTITUTIONAL:  No fevers, chills, sweats or weight changes  EYES:  No redness or visual symptoms.  EARS, NOSE AND THROAT:  No difficulties with hearing.  No symptoms of rhinitis or sore throat.  CARDIOVASCULAR:  No chest pains, palpitations, orthopnea or paroxysmal

## 2024-03-27 ENCOUNTER — OFFICE VISIT (OUTPATIENT)
Age: 19
End: 2024-03-27

## 2024-03-27 VITALS
HEIGHT: 73 IN | RESPIRATION RATE: 18 BRPM | OXYGEN SATURATION: 96 % | HEART RATE: 58 BPM | WEIGHT: 250 LBS | TEMPERATURE: 98.3 F | BODY MASS INDEX: 33.13 KG/M2

## 2024-03-27 DIAGNOSIS — L01.00 IMPETIGO: Primary | ICD-10-CM

## 2024-03-27 LAB
EST. AVERAGE GLUCOSE BLD GHB EST-MCNC: 88.2 MG/DL
HBA1C MFR BLD: 4.7 %

## 2024-03-27 RX ORDER — KETOCONAZOLE 20 MG/G
CREAM TOPICAL
Qty: 60 G | Refills: 1 | Status: SHIPPED | OUTPATIENT
Start: 2024-03-27

## 2024-03-27 NOTE — PROGRESS NOTES
Doug Serna (:  2005) is a 18 y.o. male, here for evaluation of the following chief complaint(s):  Rash (Pt c/o itchy, painful rash on both arms since Friday, pt received cortisone and steroids 2 days ago from PCP)      ASSESSMENT/PLAN:  Visit Diagnoses and Associated Orders       Impetigo    -  Primary    [place] - Hossein Garcia MD, Dermatology, [place] [REF19 Custom]      Culture, Aerobic and Anaerobic [41700 Custom]           ORDERS WITHOUT AN ASSOCIATED DIAGNOSIS    ketoconazole (NIZORAL) 2 % cream [90772]             Summary    - Patient's exam and complaint suggests that this rash seems more likely to be a fungal infection seeing how he failed dual steroid therapy and since then his rash has worsened. .    Differentials: Herpes Zoster, Varicella, Atopic Dermatitis, Tinea Corporis, Scabies or other insect/parasite, Contact Dermatitis.    SUBJECTIVE/OBJECTIVE:  VANGIE Camacho presents to the clinic with a rash which onset 2 days ago. Hives are described as a red, flat, painful, and weeping skin rash that occurs on the arms and trunk .     He denies using new deodorant, shampoo, soap, detergent, cologne/perfume/body spray, clothes, medications, jewelry, lotions.    Treatments tried include topical steroids and oral steroids with no relief after being told that it is likely poison sumac.     Vitals:    24   Pulse: 58   Resp: 18   Temp: 98.3 °F (36.8 °C)   SpO2: 96%   Weight: 113.4 kg (250 lb)   Height: 1.854 m (6' 1\")     No results found for this visit on 24.     No orders to display       Review of Systems      Physical Exam  Vitals reviewed.   Constitutional:       Appearance: He is normal weight.   HENT:      Head: Normocephalic.      Mouth/Throat:      Mouth: Mucous membranes are moist.   Eyes:      Pupils: Pupils are equal, round, and reactive to light.   Pulmonary:      Effort: Pulmonary effort is normal.   Abdominal:      Tenderness: There is no guarding.

## 2024-03-28 ENCOUNTER — TELEPHONE (OUTPATIENT)
Dept: INTERNAL MEDICINE CLINIC | Age: 19
End: 2024-03-28

## 2024-03-28 NOTE — TELEPHONE ENCOUNTER
Patient called because he saw Dr. Saldaña on Tuesday for itchy skin and was told it was probably poison oak and was given hydrocortisone for the rash.  Since Tuesday, his rash has severely worsened. He has lots of blisters. He went to an urgent care and was told it was not poison oak but possibly was impetigo.  They gave him an antifungal cream to use. Patient states his skin is not getting better. He called a dermatologist, but cannot get in with them for over a year.  Please advise.   Patient can be reached at [phone]

## 2024-03-29 ENCOUNTER — HOSPITAL ENCOUNTER (EMERGENCY)
Age: 19
Discharge: HOME | End: 2024-03-29
Payer: COMMERCIAL

## 2024-03-29 VITALS
TEMPERATURE: 98.3 F | DIASTOLIC BLOOD PRESSURE: 74 MMHG | OXYGEN SATURATION: 96 % | RESPIRATION RATE: 18 BRPM | SYSTOLIC BLOOD PRESSURE: 145 MMHG | HEART RATE: 78 BPM

## 2024-03-29 DIAGNOSIS — L01.00 IMPETIGO: Primary | ICD-10-CM

## 2024-03-29 PROCEDURE — 99283 EMERGENCY DEPT VISIT LOW MDM: CPT

## 2024-03-29 RX ORDER — CEPHALEXIN 500 MG/1
500 CAPSULE ORAL 4 TIMES DAILY
Qty: 40 CAPSULE | Refills: 0 | Status: SHIPPED | OUTPATIENT
Start: 2024-03-29 | End: 2024-04-08

## 2024-03-29 ASSESSMENT — PAIN SCALES - GENERAL: PAINLEVEL_OUTOF10: 5

## 2024-03-29 ASSESSMENT — PAIN - FUNCTIONAL ASSESSMENT: PAIN_FUNCTIONAL_ASSESSMENT: 0-10

## 2024-03-29 NOTE — ED PROVIDER NOTES
[facility] Emergency Department    CHIEF COMPLAINT  Rash (Pt noticed rash on last week on Thursday, progressively worsening. Pt had hydrocortisone and steroids on Monday. )      SHARED SERVICE VISIT  Evaluated by LAKSHMI.  My supervising physician was available for consultation.    HISTORY OF PRESENT ILLNESS  Doug Serna is a 18 y.o. male who presents to the ED complaining of pruritic rash to his  left forearm, right inner arm and back ongoing for approximately 1 week.  Was evaluated by primary care provider and was prescribed oral and topical steroids which have not helped.  Saw primary care yesterday and was given ketoconazole and told he had impetigo.  He said the itching is still bothering him and the rash is now using and crusting over.  He says that at work the wound oozes through his shirts. Denies any headache, body ache, fevers or chills.  Denies any coughing or sneezing.  Denies any sore throat or congestion.  Denies any vision changes or dizziness.  Denies any chest pain, shortness of breath, or dyspnea on exertion.  Denies any nausea, vomiting, or abdominal pain.  Denies any urinary symptoms.  Denies any diarrhea or bloody stools.  Denies any new onset back pain.  Denies any recent travel or sick contacts.    No other complaints, modifying factors or associated symptoms.     Nursing notes reviewed.   Past Medical History:   Diagnosis Date    ADHD (attention deficit hyperactivity disorder)     Anxiety     Depression     Seasonal allergies      Past Surgical History:   Procedure Laterality Date    ADENOIDECTOMY      TONSILLECTOMY      TYMPANOPLASTY       History reviewed. No pertinent family history.  Social History     Socioeconomic History    Marital status: Single     Spouse name: Not on file    Number of children: Not on file    Years of education: Not on file    Highest education level: Not on file   Occupational History    Not on file   Tobacco Use    Smoking

## 2024-03-29 NOTE — ED NOTES
Discharge paperwork given to and reviewed with pt. Pt verbalized understanding and all questions answered. Pt encouraged to return if having worsening symptoms or new symptoms discussed in discharge paperwork.  Pt to follow up with PCP, Dermatology  Rx x 1 given and medications reviewed with pt.    Pt in NAD, RR even and unlabored. Pt off unit ambulatory with significant other

## 2024-03-30 ENCOUNTER — HOSPITAL ENCOUNTER (EMERGENCY)
Age: 19
Discharge: HOME | End: 2024-03-30
Attending: EMERGENCY MEDICINE
Payer: COMMERCIAL

## 2024-03-30 VITALS
RESPIRATION RATE: 14 BRPM | SYSTOLIC BLOOD PRESSURE: 135 MMHG | HEART RATE: 78 BPM | TEMPERATURE: 98 F | DIASTOLIC BLOOD PRESSURE: 82 MMHG | BODY MASS INDEX: 33.13 KG/M2 | OXYGEN SATURATION: 95 % | WEIGHT: 250 LBS | HEIGHT: 73 IN

## 2024-03-30 DIAGNOSIS — L25.9 CONTACT DERMATITIS, UNSPECIFIED CONTACT DERMATITIS TYPE, UNSPECIFIED TRIGGER: ICD-10-CM

## 2024-03-30 DIAGNOSIS — M79.602 LEFT ARM PAIN: Primary | ICD-10-CM

## 2024-03-30 PROCEDURE — 2580000003 HC RX 258: Performed by: EMERGENCY MEDICINE

## 2024-03-30 PROCEDURE — 96372 THER/PROPH/DIAG INJ SC/IM: CPT

## 2024-03-30 PROCEDURE — 99284 EMERGENCY DEPT VISIT MOD MDM: CPT

## 2024-03-30 PROCEDURE — 6360000002 HC RX W HCPCS: Performed by: EMERGENCY MEDICINE

## 2024-03-30 RX ORDER — DIPHENHYDRAMINE HYDROCHLORIDE 50 MG/ML
50 INJECTION INTRAMUSCULAR; INTRAVENOUS ONCE
Status: COMPLETED | OUTPATIENT
Start: 2024-03-30 | End: 2024-03-30

## 2024-03-30 RX ORDER — PREDNISONE 10 MG/1
TABLET ORAL
Qty: 20 TABLET | Refills: 0 | Status: SHIPPED | OUTPATIENT
Start: 2024-03-30 | End: 2024-04-06

## 2024-03-30 RX ORDER — TRIAMCINOLONE ACETONIDE 0.25 MG/G
OINTMENT TOPICAL
Qty: 80 G | Refills: 1 | Status: SHIPPED | OUTPATIENT
Start: 2024-03-30 | End: 2024-04-06

## 2024-03-30 RX ADMIN — METHYLPREDNISOLONE SODIUM SUCCINATE 62.5 MG: 125 INJECTION INTRAMUSCULAR; INTRAVENOUS at 03:22

## 2024-03-30 RX ADMIN — DIPHENHYDRAMINE HYDROCHLORIDE 50 MG: 50 INJECTION INTRAMUSCULAR; INTRAVENOUS at 03:22

## 2024-03-30 ASSESSMENT — PAIN SCALES - GENERAL: PAINLEVEL_OUTOF10: 0

## 2024-03-30 ASSESSMENT — PAIN - FUNCTIONAL ASSESSMENT
PAIN_FUNCTIONAL_ASSESSMENT: NONE - DENIES PAIN
PAIN_FUNCTIONAL_ASSESSMENT: 0-10

## 2024-03-30 NOTE — ED PROVIDER NOTES
EMERGENCY DEPARTMENT ENCOUNTER     [facility]  ED     Pt Name: Doug Serna   MRN: [id number]   Birthdate 2005   Date of evaluation: 3/30/2024   Provider: Josiah Elam DO   PCP: Freddy Saldaña MD   Note Started: 2:04 AM EDT 3/30/24     CHIEF COMPLAINT     Chief Complaint   Patient presents with    Arm Pain     Taking abx for LUE. Pt stating L hand now feels like \"cold water is rushing down\" w/ new onset swelling. Pulses intact.         HISTORY OF PRESENT ILLNESS:  History from : Patient and girlfriend    Limitations to history : None     Doug Serna is a 18 y.o. male with a history of ADHD, anxiety, and depression who presents with left arm pain. Patient admits to numbness and tingling in his fourth and fifth digit that started today.  He states that he feels like there is cold water in his hand, and he can feel it moving back-and-forth whenever he uses his left arm. Patient states that he came to the emergency department 12 hours ago for a rash and was diagnosed with impetigo. He states that the rash has gotten worse since he was last seen. He denies fever, chills, and drug allergies. Patient is currently taking Keflex and Benadryl at home.    Nursing Notes were all reviewed and agreed with or any disagreements were addressed in the HPI.     ROS: Positives and Pertinent negatives as per HPI.    PAST MEDICAL HISTORY     Past medical history:  has a past medical history of ADHD (attention deficit hyperactivity disorder), Anxiety, Depression, and Seasonal allergies.    Past surgical history:  has a past surgical history that includes Tonsillectomy; Adenoidectomy; and Tympanoplasty.      PHYSICAL EXAM:     Physical Exam  Constitutional:       Appearance: Normal appearance.   HENT:      Head: Normocephalic and atraumatic.      Right Ear: External ear normal.      Left Ear: External ear normal.      Nose: Nose normal.   Eyes:      Extraocular Movements:

## 2024-03-30 NOTE — ED PROVIDER NOTES
EMERGENCY DEPARTMENT PROVIDER NOTE;   RESIDENT ATTESTATION      CHIEF COMPLAINT  Chief Complaint   Patient presents with    Arm Pain     Taking abx for LUE. Pt stating L hand now feels like \"cold water is rushing down\" w/ new onset swelling. Pulses intact.          Vitals:    03/30/24 0206 03/30/24 0208 03/30/24 0210   BP:   137/68   Pulse:   79   Resp:  16    Temp:  97.7 °F (36.5 °C)    TempSrc:  Oral    SpO2:   95%   Weight: 113.4 kg (250 lb)     Height: 1.854 m (6' 1\")           Doug Serna is a 18 y.o. male presenting to the ED with ***                  3:59 AM EDT  Says his arm feels much less tight, and the redness especially under the volar aspect of the left arm is significantly improved.  Will discharge home with high-dose steroid taper, triamcinolone, already taking Keflex for staph and strep, does not appear to be MRSA.  Discharged home, already has a dermatology appointment in 5 days.                                             IMPRESSION:  No diagnosis found.      IRebeka DO am the primary clinician of record. Although initial history information was obtained by the resident physician as documented, I personally evaluated and managed the patient. I personally performed: supervision of the patient's care, physical exam, and the medical decision making. I agree with the documentation of the resident physician above. We have discussed patient's care at length.     DO FLORENCIO WEISS [employer]

## 2024-03-31 LAB
BACTERIA SPEC AEROBE CULT: NORMAL
BACTERIA SPEC ANAEROBE CULT: NORMAL
GRAM STN SPEC: NORMAL

## 2024-04-02 LAB
BACTERIA SPEC AEROBE CULT: NORMAL
BACTERIA SPEC ANAEROBE CULT: NORMAL
GRAM STN SPEC: NORMAL